# Patient Record
Sex: FEMALE | Race: WHITE | NOT HISPANIC OR LATINO | Employment: OTHER | URBAN - METROPOLITAN AREA
[De-identification: names, ages, dates, MRNs, and addresses within clinical notes are randomized per-mention and may not be internally consistent; named-entity substitution may affect disease eponyms.]

---

## 2017-02-01 ENCOUNTER — ALLSCRIPTS OFFICE VISIT (OUTPATIENT)
Dept: OTHER | Facility: OTHER | Age: 77
End: 2017-02-01

## 2017-02-01 DIAGNOSIS — Z00.00 ENCOUNTER FOR GENERAL ADULT MEDICAL EXAMINATION WITHOUT ABNORMAL FINDINGS: ICD-10-CM

## 2017-02-01 DIAGNOSIS — Z12.31 ENCOUNTER FOR SCREENING MAMMOGRAM FOR MALIGNANT NEOPLASM OF BREAST: ICD-10-CM

## 2017-03-28 ENCOUNTER — ALLSCRIPTS OFFICE VISIT (OUTPATIENT)
Dept: OTHER | Facility: OTHER | Age: 77
End: 2017-03-28

## 2018-01-13 VITALS
SYSTOLIC BLOOD PRESSURE: 118 MMHG | WEIGHT: 168 LBS | DIASTOLIC BLOOD PRESSURE: 64 MMHG | HEIGHT: 62 IN | TEMPERATURE: 98 F | BODY MASS INDEX: 30.91 KG/M2

## 2018-01-14 VITALS
BODY MASS INDEX: 31.47 KG/M2 | WEIGHT: 171 LBS | HEIGHT: 62 IN | SYSTOLIC BLOOD PRESSURE: 126 MMHG | DIASTOLIC BLOOD PRESSURE: 74 MMHG

## 2018-09-12 ENCOUNTER — TELEPHONE (OUTPATIENT)
Dept: OBGYN CLINIC | Facility: CLINIC | Age: 78
End: 2018-09-12

## 2018-09-12 ENCOUNTER — ANNUAL EXAM (OUTPATIENT)
Dept: OBGYN CLINIC | Age: 78
End: 2018-09-12
Payer: MEDICARE

## 2018-09-12 VITALS
BODY MASS INDEX: 30.32 KG/M2 | HEIGHT: 63 IN | SYSTOLIC BLOOD PRESSURE: 108 MMHG | DIASTOLIC BLOOD PRESSURE: 68 MMHG | WEIGHT: 171.13 LBS

## 2018-09-12 DIAGNOSIS — Z01.411 ENCOUNTER FOR GYNECOLOGICAL EXAMINATION WITH ABNORMAL FINDING: Primary | ICD-10-CM

## 2018-09-12 DIAGNOSIS — Z12.31 ENCOUNTER FOR SCREENING MAMMOGRAM FOR MALIGNANT NEOPLASM OF BREAST: ICD-10-CM

## 2018-09-12 DIAGNOSIS — N95.2 ATROPHIC VAGINITIS: ICD-10-CM

## 2018-09-12 DIAGNOSIS — Z78.0 ASYMPTOMATIC AGE-RELATED POSTMENOPAUSAL STATE: ICD-10-CM

## 2018-09-12 PROBLEM — N81.11 MIDLINE CYSTOCELE: Status: ACTIVE | Noted: 2017-02-01

## 2018-09-12 PROCEDURE — G0101 CA SCREEN;PELVIC/BREAST EXAM: HCPCS | Performed by: NURSE PRACTITIONER

## 2018-09-12 RX ORDER — ALPRAZOLAM 0.5 MG/1
0.5 TABLET ORAL 2 TIMES DAILY
COMMUNITY
Start: 2018-04-24

## 2018-09-12 RX ORDER — LEVOTHYROXINE SODIUM 88 UG/1
TABLET ORAL
COMMUNITY
Start: 2018-08-29

## 2018-09-12 RX ORDER — LISINOPRIL 2.5 MG/1
TABLET ORAL
COMMUNITY

## 2018-09-12 NOTE — PATIENT INSTRUCTIONS
Dermatitis   WHAT YOU NEED TO KNOW:   What is dermatitis? Dermatitis is skin inflammation  You may have an itchy rash, redness, or swelling  You may also have bumps or blisters that crust over or ooze clear fluid  What causes dermatitis? Dermatitis may be caused by allergens such as dust mites, pet dander, pollen, and certain foods  Dermatitis can also develop when something touches your skin and irritates it or causes an allergic reaction  Examples include soaps, chemicals, latex, and poison ivy  How is dermatitis diagnosed? Your healthcare provider will examine your skin  He will ask questions about your rash and any other symptoms you have  Tell him if you noticed anything trigger your rash, such as a certain food or activity  Tell him about any medicines you are taking or any allergies or medical conditions you have  How is dermatitis treated? Treatment depends on the cause of your rash  You may need medicines to help decrease itching and inflammation or treat a bacterial infection  They may be given as a topical cream, shot, or a pill  How can I manage my symptoms? · Apply a cool compress to your rash  This will help soothe your skin  · Keep your skin moist   Rub unscented cream or lotion on your skin to prevent dryness and itching  Do this right after a lukewarm bath or shower when your skin is still damp  · Avoid skin irritants  Do not use skin irritants, such as makeup, hair products, soaps, and cleansers  Use products that do not contain fragrances or dye  Call 911 if you have any of the following symptoms of anaphylaxis:   · Sudden trouble breathing    · Throat swelling and tightness    · Dizziness, lightheadedness, fainting, or confusion  When should I seek immediate care? · You develop a fever or have red streaks going up your arm or leg  · Your rash gets more swollen, red, or hot  When should I contact my healthcare provider?    · Your skin blisters, oozes white or yellow pus, or has a foul-smelling discharge  · Your rash spreads or does not get better, even after treatment  · You have questions or concerns about your condition or care  CARE AGREEMENT:   You have the right to help plan your care  Learn about your health condition and how it may be treated  Discuss treatment options with your caregivers to decide what care you want to receive  You always have the right to refuse treatment  The above information is an  only  It is not intended as medical advice for individual conditions or treatments  Talk to your doctor, nurse or pharmacist before following any medical regimen to see if it is safe and effective for you  © 2017 [facility] Information is for End User's use only and may not be sold, redistributed or otherwise used for commercial purposes  All illustrations and images included in CareNotes® are the copyrighted property of A D A M [employer] Inc  or [employer]

## 2018-09-12 NOTE — PROGRESS NOTES
Assessment/Plan:    No problem-specific Assessment & Plan notes found for this encounter  Diagnoses and all orders for this visit:    Encounter for gynecological examination with abnormal finding    Atrophic vaginitis  -     conjugated estrogens (PREMARIN) vaginal cream; Insert 0 5 g into the vagina 2 (two) times a week for 30 days    Asymptomatic age-related postmenopausal state  -     DXA bone density spine hip and pelvis; Future    Encounter for screening mammogram for malignant neoplasm of breast  -     Mammo screening bilateral w 3d & cad; Future    Other orders  -     Cancel: DXA bone density spine hip and pelvis; Future  -     ALPRAZolam (XANAX) 0 5 mg tablet; Take 0 5 mg by mouth 2 (two) times a day  -     aspirin 325 mg tablet; daily  -     Calcium Carbonate (CALTRATE 600) 1500 (600 Ca) MG TABS; 1 tab(s)  -     levothyroxine 88 mcg tablet; TAKE 1 TABLET BY MOUTH ON EMPTY STOMACH EVERY MORNING  -     lisinopril (ZESTRIL) 2 5 mg tablet; Take by mouth  -     Multiple Vitamins-Minerals (MULTIVITAMIN ADULT PO); 1 cap(s)      Call as needed, encouraged calcium/vit D in her diet, all questions answered, return in 6 wks if no relief with premarin for a poss vulvar bx  Subjective:      Patient ID: Jimi Reynolds is a 77 y o  female  Pleasant 77 y o  postmenopausal female here for annual exam  She denies postmenopausal bleeding  Denies history of abnormal pap smears, no pap today  Stopped using pessary, too cumbersome, dealing with incontinence  Also ran out of vaginal premarin 2 weeks ago and since then has been having  vaginal issues of itching and irritation  Denies pelvic pain  Denies postmenopausal issues  Not sexually active  Colonoscopy UTD, followed by PCP  DXA overdue  The following portions of the patient's history were reviewed and updated as appropriate:   She  has no past medical history on file    She   Patient Active Problem List    Diagnosis Date Noted    Atrophic vaginitis 2018    Encounter for gynecological examination with abnormal finding 2018    Atrophic vulvovaginitis 2017    Midline cystocele 2017    Urge incontinence of urine 2013     She  has no past surgical history on file  Her family history includes Colon cancer in her mother  She  reports that she has never smoked  She has never used smokeless tobacco  She reports that she drinks alcohol  She reports that she does not use drugs  Current Outpatient Prescriptions   Medication Sig Dispense Refill    ALPRAZolam (XANAX) 0 5 mg tablet Take 0 5 mg by mouth 2 (two) times a day      aspirin 325 mg tablet daily   Calcium Carbonate (CALTRATE 600) 1500 (600 Ca) MG TABS 1 tab(s)      levothyroxine 88 mcg tablet TAKE 1 TABLET BY MOUTH ON EMPTY STOMACH EVERY MORNING      lisinopril (ZESTRIL) 2 5 mg tablet Take by mouth      Multiple Vitamins-Minerals (MULTIVITAMIN ADULT PO) 1 cap(s)      [START ON 2018] conjugated estrogens (PREMARIN) vaginal cream Insert 0 5 g into the vagina 2 (two) times a week for 30 days 42 5 g 1     No current facility-administered medications for this visit  She has No Known Allergies  OB History    Para Term  AB Living   4 4       4   SAB TAB Ectopic Multiple Live Births           4      # Outcome Date GA Lbr Moe/2nd Weight Sex Delivery Anes PTL Lv   4 Para            3 Para            2 Para            1 Para                   Review of Systems   Constitutional: Negative for activity change, chills, fatigue, fever and unexpected weight change  HENT: Negative for mouth sores and trouble swallowing  Respiratory: Negative for shortness of breath  Gastrointestinal: Negative for anal bleeding, blood in stool, constipation and diarrhea  Genitourinary: Negative for difficulty urinating, dysuria, genital sores and hematuria  Neurological: Negative for weakness  Psychiatric/Behavioral: Negative for confusion and self-injury  Objective:      /68 (BP Location: Left arm, Patient Position: Sitting, Cuff Size: Standard)   Ht 5' 3" (1 6 m)   Wt 77 6 kg (171 lb 2 oz)   LMP  (LMP Unknown)   BMI 30 31 kg/m²          Physical Exam   Constitutional: She appears well-developed and well-nourished  No distress  HENT:   Head: Normocephalic  Neck: Normal range of motion  Pulmonary/Chest: Effort normal  Right breast exhibits no inverted nipple, no mass, no nipple discharge, no skin change and no tenderness  Left breast exhibits no inverted nipple, no mass, no nipple discharge, no skin change and no tenderness  Breasts are symmetrical    Abdominal: Soft  Genitourinary: No breast swelling, tenderness, discharge or bleeding  Pelvic exam was performed with patient supine  There is labial fusion  There is no rash, tenderness, lesion or injury on the right labia  There is no rash, tenderness, lesion or injury on the left labia  Uterus is not deviated, not enlarged, not fixed and not tender  Cervix exhibits no motion tenderness, no discharge and no friability  Right adnexum displays no mass, no tenderness and no fullness  Left adnexum displays no mass, no tenderness and no fullness  No erythema, tenderness or bleeding in the vagina  No foreign body in the vagina  No signs of injury around the vagina  No vaginal discharge found  Genitourinary Comments: Lichen sclerosis features on upper bilateral portions of her labia   Lymphadenopathy:        Right: No inguinal adenopathy present  Left: No inguinal adenopathy present  Vitals reviewed

## 2018-09-13 ENCOUNTER — TELEPHONE (OUTPATIENT)
Dept: OBGYN CLINIC | Facility: CLINIC | Age: 78
End: 2018-09-13

## 2018-09-13 DIAGNOSIS — N95.2 ATROPHIC VAGINITIS: Primary | ICD-10-CM

## 2018-09-14 RX ORDER — ESTRADIOL 0.1 MG/G
0.5 CREAM VAGINAL 2 TIMES WEEKLY
Qty: 42.5 G | Refills: 1 | Status: SHIPPED | OUTPATIENT
Start: 2018-09-17 | End: 2022-04-05 | Stop reason: SDUPTHER

## 2018-11-06 ENCOUNTER — HOSPITAL ENCOUNTER (OUTPATIENT)
Dept: MAMMOGRAPHY | Facility: CLINIC | Age: 78
Discharge: HOME | End: 2018-11-06
Payer: MEDICARE

## 2018-11-06 DIAGNOSIS — Z78.0 ASYMPTOMATIC AGE-RELATED POSTMENOPAUSAL STATE: ICD-10-CM

## 2018-11-06 PROCEDURE — 77080 DXA BONE DENSITY AXIAL: CPT

## 2018-11-09 ENCOUNTER — TELEPHONE (OUTPATIENT)
Dept: OBGYN CLINIC | Facility: CLINIC | Age: 78
End: 2018-11-09

## 2018-11-09 NOTE — TELEPHONE ENCOUNTER
----- Message from Daryl Dumont, [address] sent at 11/9/2018  8:24 AM EST -----  Please notify patient her Dexa scan results showed MODERATE OSTEOPENIA  We can start medication for this if she agrees  She should also continue to work on calcium/vitamin D in her diet and weight bearing exercises  Thanks

## 2019-03-04 ENCOUNTER — HOSPITAL ENCOUNTER (EMERGENCY)
Facility: HOSPITAL | Age: 79
Discharge: HOME | End: 2019-03-04
Attending: EMERGENCY MEDICINE | Admitting: EMERGENCY MEDICINE
Payer: MEDICARE

## 2019-03-04 VITALS
SYSTOLIC BLOOD PRESSURE: 134 MMHG | DIASTOLIC BLOOD PRESSURE: 68 MMHG | WEIGHT: 171.52 LBS | BODY MASS INDEX: 30.39 KG/M2 | TEMPERATURE: 98.6 F | HEIGHT: 63 IN | OXYGEN SATURATION: 95 % | HEART RATE: 102 BPM | RESPIRATION RATE: 16 BRPM

## 2019-03-04 DIAGNOSIS — R04.0 LEFT-SIDED EPISTAXIS: Primary | ICD-10-CM

## 2019-03-04 PROCEDURE — 99283 EMERGENCY DEPT VISIT LOW MDM: CPT

## 2019-03-04 RX ORDER — OXYMETAZOLINE HYDROCHLORIDE 0.05 G/100ML
2 SPRAY NASAL ONCE
Status: COMPLETED | OUTPATIENT
Start: 2019-03-04 | End: 2019-03-04

## 2019-03-04 RX ORDER — OXYMETAZOLINE HYDROCHLORIDE 0.05 G/100ML
2 SPRAY NASAL 2 TIMES DAILY
Qty: 30 ML | Refills: 0 | Status: SHIPPED | OUTPATIENT
Start: 2019-03-04 | End: 2019-03-26 | Stop reason: ALTCHOICE

## 2019-03-04 RX ADMIN — OXYMETAZOLINE HYDROCHLORIDE 2 SPRAY: 5 SPRAY NASAL at 11:50
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
## 2020-12-13 ENCOUNTER — NURSE TRIAGE (OUTPATIENT)
Dept: OTHER | Facility: OTHER | Age: 80
End: 2020-12-13

## 2021-07-19 ENCOUNTER — APPOINTMENT (EMERGENCY)
Dept: RADIOLOGY | Facility: HOSPITAL | Age: 81
End: 2021-07-19
Payer: MEDICARE

## 2021-07-19 ENCOUNTER — HOSPITAL ENCOUNTER (EMERGENCY)
Facility: HOSPITAL | Age: 81
Discharge: HOME | End: 2021-07-19
Attending: EMERGENCY MEDICINE | Admitting: EMERGENCY MEDICINE
Payer: MEDICARE

## 2021-07-19 VITALS
SYSTOLIC BLOOD PRESSURE: 126 MMHG | RESPIRATION RATE: 18 BRPM | HEART RATE: 77 BPM | OXYGEN SATURATION: 97 % | TEMPERATURE: 97.7 F | DIASTOLIC BLOOD PRESSURE: 66 MMHG

## 2021-07-19 DIAGNOSIS — R42 LIGHTHEADEDNESS: Primary | ICD-10-CM

## 2021-07-19 LAB
ALBUMIN SERPL BCP-MCNC: 4.1 G/DL (ref 3.5–5)
ALP SERPL-CCNC: 82 U/L (ref 46–116)
ALT SERPL W P-5'-P-CCNC: 24 U/L (ref 12–78)
ANION GAP SERPL CALCULATED.3IONS-SCNC: 11 MMOL/L (ref 4–13)
AST SERPL W P-5'-P-CCNC: 24 U/L (ref 5–45)
ATRIAL RATE: 75 BPM
BACTERIA UR QL AUTO: ABNORMAL /HPF
BASOPHILS # BLD AUTO: 0.05 THOUSANDS/ΜL (ref 0–0.1)
BASOPHILS NFR BLD AUTO: 1 % (ref 0–1)
BILIRUB SERPL-MCNC: 0.46 MG/DL (ref 0.2–1)
BILIRUB UR QL STRIP: NEGATIVE
BUN SERPL-MCNC: 20 MG/DL (ref 5–25)
CALCIUM SERPL-MCNC: 9.3 MG/DL (ref 8.3–10.1)
CHLORIDE SERPL-SCNC: 105 MMOL/L (ref 100–108)
CLARITY UR: CLEAR
CO2 SERPL-SCNC: 27 MMOL/L (ref 21–32)
COLOR UR: ABNORMAL
CREAT SERPL-MCNC: 1.02 MG/DL (ref 0.6–1.3)
EOSINOPHIL # BLD AUTO: 0.02 THOUSAND/ΜL (ref 0–0.61)
EOSINOPHIL NFR BLD AUTO: 0 % (ref 0–6)
ERYTHROCYTE [DISTWIDTH] IN BLOOD BY AUTOMATED COUNT: 12.6 % (ref 11.6–15.1)
GFR SERPL CREATININE-BSD FRML MDRD: 51 ML/MIN/1.73SQ M
GLUCOSE SERPL-MCNC: 115 MG/DL (ref 65–140)
GLUCOSE UR STRIP-MCNC: NEGATIVE MG/DL
HCT VFR BLD AUTO: 45.4 % (ref 34.8–46.1)
HGB BLD-MCNC: 15.1 G/DL (ref 11.5–15.4)
HGB UR QL STRIP.AUTO: ABNORMAL
IMM GRANULOCYTES # BLD AUTO: 0.02 THOUSAND/UL (ref 0–0.2)
IMM GRANULOCYTES NFR BLD AUTO: 0 % (ref 0–2)
KETONES UR STRIP-MCNC: NEGATIVE MG/DL
LEUKOCYTE ESTERASE UR QL STRIP: NEGATIVE
LYMPHOCYTES # BLD AUTO: 1.12 THOUSANDS/ΜL (ref 0.6–4.47)
LYMPHOCYTES NFR BLD AUTO: 21 % (ref 14–44)
MAGNESIUM SERPL-MCNC: 2.2 MG/DL (ref 1.6–2.6)
MCH RBC QN AUTO: 31.7 PG (ref 26.8–34.3)
MCHC RBC AUTO-ENTMCNC: 33.3 G/DL (ref 31.4–37.4)
MCV RBC AUTO: 95 FL (ref 82–98)
MONOCYTES # BLD AUTO: 0.36 THOUSAND/ΜL (ref 0.17–1.22)
MONOCYTES NFR BLD AUTO: 7 % (ref 4–12)
NEUTROPHILS # BLD AUTO: 3.81 THOUSANDS/ΜL (ref 1.85–7.62)
NEUTS SEG NFR BLD AUTO: 71 % (ref 43–75)
NITRITE UR QL STRIP: NEGATIVE
NON-SQ EPI CELLS URNS QL MICRO: ABNORMAL /HPF
NRBC BLD AUTO-RTO: 0 /100 WBCS
P AXIS: 66 DEGREES
PH UR STRIP.AUTO: 5.5 [PH]
PLATELET # BLD AUTO: 301 THOUSANDS/UL (ref 149–390)
PMV BLD AUTO: 9 FL (ref 8.9–12.7)
POTASSIUM SERPL-SCNC: 4.6 MMOL/L (ref 3.5–5.3)
PR INTERVAL: 144 MS
PROT SERPL-MCNC: 7.9 G/DL (ref 6.4–8.2)
PROT UR STRIP-MCNC: NEGATIVE MG/DL
QRS AXIS: -35 DEGREES
QRSD INTERVAL: 82 MS
QT INTERVAL: 398 MS
QTC INTERVAL: 444 MS
RBC # BLD AUTO: 4.76 MILLION/UL (ref 3.81–5.12)
RBC #/AREA URNS AUTO: ABNORMAL /HPF
SODIUM SERPL-SCNC: 143 MMOL/L (ref 136–145)
SP GR UR STRIP.AUTO: <=1.005 (ref 1–1.03)
T WAVE AXIS: 28 DEGREES
TROPONIN I SERPL-MCNC: <0.02 NG/ML
UROBILINOGEN UR QL STRIP.AUTO: 0.2 E.U./DL
VENTRICULAR RATE: 75 BPM
WBC # BLD AUTO: 5.38 THOUSAND/UL (ref 4.31–10.16)
WBC #/AREA URNS AUTO: ABNORMAL /HPF

## 2021-07-19 PROCEDURE — 81001 URINALYSIS AUTO W/SCOPE: CPT | Performed by: EMERGENCY MEDICINE

## 2021-07-19 PROCEDURE — 84484 ASSAY OF TROPONIN QUANT: CPT | Performed by: EMERGENCY MEDICINE

## 2021-07-19 PROCEDURE — 99284 EMERGENCY DEPT VISIT MOD MDM: CPT | Performed by: EMERGENCY MEDICINE

## 2021-07-19 PROCEDURE — 96360 HYDRATION IV INFUSION INIT: CPT

## 2021-07-19 PROCEDURE — 93010 ELECTROCARDIOGRAM REPORT: CPT | Performed by: INTERNAL MEDICINE

## 2021-07-19 PROCEDURE — 93005 ELECTROCARDIOGRAM TRACING: CPT

## 2021-07-19 PROCEDURE — 96361 HYDRATE IV INFUSION ADD-ON: CPT

## 2021-07-19 PROCEDURE — 36415 COLL VENOUS BLD VENIPUNCTURE: CPT | Performed by: EMERGENCY MEDICINE

## 2021-07-19 PROCEDURE — 80053 COMPREHEN METABOLIC PANEL: CPT | Performed by: EMERGENCY MEDICINE

## 2021-07-19 PROCEDURE — 85025 COMPLETE CBC W/AUTO DIFF WBC: CPT | Performed by: EMERGENCY MEDICINE

## 2021-07-19 PROCEDURE — 83735 ASSAY OF MAGNESIUM: CPT | Performed by: EMERGENCY MEDICINE

## 2021-07-19 PROCEDURE — 71045 X-RAY EXAM CHEST 1 VIEW: CPT

## 2021-07-19 PROCEDURE — 99284 EMERGENCY DEPT VISIT MOD MDM: CPT

## 2021-07-19 RX ADMIN — SODIUM CHLORIDE 1000 ML: 0.9 INJECTION, SOLUTION INTRAVENOUS at 14:06

## 2021-07-19 NOTE — ED PROVIDER NOTES
Pt Name: Fidelia Farley  MRN: [id number]  [place] 1940  Age/Sex: 79 y o  female  Date of evaluation: 7/19/2021  PCP: Alexandra Gray MD    CHIEF COMPLAINT    Chief Complaint   Patient presents with    Dizziness     Pt reports she hs a hx of vertigo but has been feeling dizzy since this morn and it doesnt feel the same  HPI    79 y o  female presenting with lightheadedness  Patient states this morning while she was getting dressed to go to her doctor's appointment she started feeling lightheaded, she felt like if it continued she may pass out  Has history of vertigo and states this did not feel similar to that  Mentions she did not feel safe to drive therefore decided come to the emergency department  States while coming to the emergency department she started feeling better  She hydrated herself and ate a salty pretzel  Denies chest pain, nausea, vomiting, visual changes, numbness/tingling, weakness, fevers, chills  She states around a year ago she had a syncopal episode but did not have any prodrome, she was evaluated and found to be dehydrated and have a UTI  She denies any urinary symptoms  Past Medical and Surgical History    Past Medical History:   Diagnosis Date    Anxiety     Arthritis     Disease of thyroid gland     Hypertension        Past Surgical History:   Procedure Laterality Date    COLON SURGERY  in 2002    TENDON MANIPULATION      maniscus repair in 2013       Family History   Problem Relation Age of Onset    Colon cancer Mother     Ovarian cancer Neg Hx     Breast cancer Neg Hx        Social History     Tobacco Use    Smoking status: Never Smoker    Smokeless tobacco: Never Used   Substance Use Topics    Alcohol use: Yes     Comment: social     Drug use: No           Allergies    No Known Allergies    Home Medications    Prior to Admission medications    Medication Sig Start Date End Date Taking?  Authorizing Provider   ALPRAZolam Donavon Gerber) 0 5 mg tablet Take 0 5 mg by mouth 2 (two) times a day 4/24/18   Historical Provider, MD   aspirin 81 MG tablet daily  Historical Provider, MD   Calcium Carbonate (CALTRATE 600) 1500 (600 Ca) MG TABS 1 tab(s)    Historical Provider, MD   estradiol (ESTRACE) 0 1 mg/g vaginal cream Insert 0 5 g into the vagina 2 (two) times a week 9/17/18   RORY Pimentel   levothyroxine 88 mcg tablet TAKE 1 TABLET BY MOUTH ON EMPTY STOMACH EVERY MORNING 8/29/18   Historical Provider, MD   lisinopril (ZESTRIL) 2 5 mg tablet Take by mouth    Historical Provider, MD   Multiple Vitamins-Minerals (MULTIVITAMIN ADULT PO) 1 cap(s)    Historical Provider, MD           Review of Systems    Review of Systems   Constitutional: Negative for chills and fever  HENT: Negative for rhinorrhea and sore throat  Eyes: Negative for pain and visual disturbance  Respiratory: Negative for cough and shortness of breath  Cardiovascular: Negative for chest pain and leg swelling  Gastrointestinal: Negative for abdominal pain, nausea and vomiting  Genitourinary: Negative for dysuria and hematuria  Musculoskeletal: Negative for back pain and myalgias  Skin: Negative for rash and wound  Neurological: Positive for light-headedness  Negative for syncope and headaches  Physical Exam      ED Triage Vitals [07/19/21 1131]   Temperature Pulse Respirations Blood Pressure SpO2   97 7 °F (36 5 °C) 85 16 147/78 97 %      Temp Source Heart Rate Source Patient Position - Orthostatic VS BP Location FiO2 (%)   Temporal Monitor Sitting Left arm --      Pain Score       --               Physical Exam  Constitutional:       General: She is not in acute distress  Appearance: She is not ill-appearing  HENT:      Head: Normocephalic and atraumatic  Nose: Nose normal    Eyes:      Extraocular Movements: Extraocular movements intact  Pupils: Pupils are equal, round, and reactive to light     Cardiovascular:      Rate and Rhythm: Normal rate and regular rhythm  Heart sounds: No murmur heard  Pulmonary:      Effort: No respiratory distress  Breath sounds: Normal breath sounds  No wheezing  Abdominal:      General: There is no distension  Palpations: Abdomen is soft  Tenderness: There is no abdominal tenderness  Musculoskeletal:         General: No swelling or deformity  Normal range of motion  Cervical back: Normal range of motion and neck supple  Skin:     General: Skin is warm  Findings: No erythema  Neurological:      Mental Status: She is alert and oriented to person, place, and time  Mental status is at baseline  Cranial Nerves: No cranial nerve deficit  Sensory: No sensory deficit  Motor: No weakness        Comments: Normal finger-nose exam bilaterally              Diagnostic Results      Labs:    Results Reviewed     Procedure Component Value Units Date/Time    Urine Microscopic [[id number]]  (Abnormal) Collected: 07/19/21 1509    Lab Status: Final result Specimen: Urine, Clean Catch Updated: 07/19/21 1534     RBC, UA 0-1 /hpf      WBC, UA None Seen /hpf      Epithelial Cells Moderate /hpf      Bacteria, UA None Seen /hpf     UA (URINE) with reflex to Scope [[id number]]  (Abnormal) Collected: 07/19/21 1509    Lab Status: Final result Specimen: Urine, Clean Catch Updated: 07/19/21 1521     Color, UA Light Yellow     Clarity, UA Clear     Specific Gravity, UA <=1 005     pH, UA 5 5     Leukocytes, UA Negative     Nitrite, UA Negative     Protein, UA Negative mg/dl      Glucose, UA Negative mg/dl      Ketones, UA Negative mg/dl      Urobilinogen, UA 0 2 E U /dl      Bilirubin, UA Negative     Blood, UA Trace-Intact    Troponin I [[id number]]  (Normal) Collected: 07/19/21 1406    Lab Status: Final result Specimen: Blood from Arm, Right Updated: 07/19/21 1432     Troponin I <0 02 ng/mL     Comprehensive metabolic panel [[id number]] Collected: 07/19/21 1406    Lab Status: Final result Specimen: Blood from Arm, Right Updated: 07/19/21 1430     Sodium 143 mmol/L      Potassium 4 6 mmol/L      Chloride 105 mmol/L      CO2 27 mmol/L      ANION GAP 11 mmol/L      BUN 20 mg/dL      Creatinine 1 02 mg/dL      Glucose 115 mg/dL      Calcium 9 3 mg/dL      AST 24 U/L      ALT 24 U/L      Alkaline Phosphatase 82 U/L      Total Protein 7 9 g/dL      Albumin 4 1 g/dL      Total Bilirubin 0 46 mg/dL      eGFR 51 ml/min/1 73sq m     Narrative:      [place] guidelines for Chronic Kidney Disease (CKD):     Stage 1 with normal or high GFR (GFR > 90 mL/min/1 73 square meters)    Stage 2 Mild CKD (GFR = 60-89 mL/min/1 73 square meters)    Stage 3A Moderate CKD (GFR = 45-59 mL/min/1 73 square meters)    Stage 3B Moderate CKD (GFR = 30-44 mL/min/1 73 square meters)    Stage 4 Severe CKD (GFR = 15-29 mL/min/1 73 square meters)    Stage 5 End Stage CKD (GFR <15 mL/min/1 73 square meters)  Note: GFR calculation is accurate only with a steady state creatinine    Magnesium [[id number]]  (Normal) Collected: 07/19/21 1406    Lab Status: Final result Specimen: Blood from Arm, Right Updated: 07/19/21 1430     Magnesium 2 2 mg/dL     CBC and differential [[id number]] Collected: 07/19/21 1406    Lab Status: Final result Specimen: Blood from Arm, Right Updated: 07/19/21 1415     WBC 5 38 Thousand/uL      RBC 4 76 Million/uL      Hemoglobin 15 1 g/dL      Hematocrit 45 4 %      MCV 95 fL      MCH 31 7 pg      MCHC 33 3 g/dL      RDW 12 6 %      MPV 9 0 fL      Platelets 849 Thousands/uL      nRBC 0 /100 WBCs      Neutrophils Relative 71 %      Immat GRANS % 0 %      Lymphocytes Relative 21 %      Monocytes Relative 7 %      Eosinophils Relative 0 %      Basophils Relative 1 %      Neutrophils Absolute 3 81 Thousands/µL      Immature Grans Absolute 0 02 Thousand/uL      Lymphocytes Absolute 1 12 Thousands/µL      Monocytes Absolute 0 36 Thousand/µL      Eosinophils Absolute 0 02 Thousand/µL      Basophils Absolute 0 05 Thousands/µL           All labs reviewed and utilized in the medical decision making process    Radiology:    XR chest 1 view portable   Final Result      No acute cardiopulmonary disease  Workstation performed: [id number]             All radiology studies independently viewed by me and interpreted by the radiologist     Procedure    Procedures        MDM    Patient describing presyncope, not concerned for vertigo or posterior circulation pathology  Neurologically intact without any focal deficits  NIHSS 0  Currently she is asymptomatic  No lightheadedness or dizziness otherwise with sitting up and standing up and walking currently  At no point did she have chest pain shortness of breath or generalized weakness  History of syncopal event without prodrome  Past medical history of hypertension  Will obtain blood work, EKG, chest x-ray  Will give IV fluids  Will re-evaluate  EKG shows normal sinus rhythm with heart rate of 75, narrow QRS, intervals within normal limits, no ST elevation, no significant ST depression, no evidence of Brugada syndrome, no delta waves  ED Course as of Jul 19 1542   Mon Jul 19, 2021   1410 No acute cardiopulmonary processes on my read  XR chest 1 view portable   1542 Blood work and UA are all reassuring  Patient remains asymptomatic  Vitals are reassuring  I did offer hospitalization, however patient would rather follow-up with her family doctor  Advised hydration  Advised to discuss Holter monitor with family doctor  Return precautions discussed, she verbalized understanding             Medications   sodium chloride 0 9 % bolus 1,000 mL (1,000 mL Intravenous New Bag 7/19/21 1406)           FINAL IMPRESSION    Final diagnoses:   Lightheadedness         DISPOSITION    Time reflects when diagnosis was documented in both MDM as applicable and the Disposition within this note     Time User Action Codes Description Comment    7/19/2021  3:37 PM Tino Weaver Add [R42] Lightheadedness       ED Disposition     ED Disposition Condition Date/Time Comment    Discharge Stable Mon Jul 19, 2021  3:37 PM Moni Rhoades discharge to home/self care  Follow-up Information     Follow up With Specialties Details Why Contact Info    Jack Evans MD Internal Medicine Schedule an appointment as soon as possible for a visit in 1 day  [address] [facility]              PATIENT REFERRED TO:    Jack Evans MD  [facility] [address]    Schedule an appointment as soon as possible for a visit in 1 day        DISCHARGE MEDICATIONS:    Patient's Medications   Discharge Prescriptions    No medications on file       No discharge procedures on file  Yadira Pace DO        This note was partially completed using voice recognition technology, and was scanned for gross errors; however some errors may still exist  Please contact the author with any questions or requests for clarification        Yadira Pace DO  07/19/21 1597

## 2021-07-19 NOTE — DISCHARGE INSTRUCTIONS
Please follow-up with your family doctor, you may benefit from Holter monitor  Drink plenty of fluids stay hydrated  Return to the emergency department for any worsening symptoms

## 2022-02-02 ENCOUNTER — OFFICE VISIT (OUTPATIENT)
Dept: OBGYN CLINIC | Facility: CLINIC | Age: 82
End: 2022-02-02
Payer: MEDICARE

## 2022-02-02 VITALS
WEIGHT: 167 LBS | BODY MASS INDEX: 30.73 KG/M2 | DIASTOLIC BLOOD PRESSURE: 90 MMHG | SYSTOLIC BLOOD PRESSURE: 130 MMHG | HEIGHT: 62 IN

## 2022-02-02 DIAGNOSIS — R32 URINARY INCONTINENCE, UNSPECIFIED TYPE: ICD-10-CM

## 2022-02-02 DIAGNOSIS — Z78.0 ASYMPTOMATIC POSTMENOPAUSAL STATUS: ICD-10-CM

## 2022-02-02 DIAGNOSIS — N95.2 ATROPHIC VULVOVAGINITIS: ICD-10-CM

## 2022-02-02 DIAGNOSIS — Z01.411 ENCOUNTER FOR GYNECOLOGICAL EXAMINATION WITH ABNORMAL FINDING: Primary | ICD-10-CM

## 2022-02-02 PROBLEM — F41.1 GENERALIZED ANXIETY DISORDER: Status: ACTIVE | Noted: 2017-12-17

## 2022-02-02 PROBLEM — E78.5 HYPERLIPIDEMIA: Status: ACTIVE | Noted: 2017-12-17

## 2022-02-02 PROBLEM — E03.9 HYPOTHYROIDISM: Status: ACTIVE | Noted: 2017-12-17

## 2022-02-02 PROBLEM — M17.9 OSTEOARTHRITIS OF KNEE: Status: ACTIVE | Noted: 2017-12-17

## 2022-02-02 PROBLEM — F32.A DEPRESSION: Status: ACTIVE | Noted: 2017-12-17

## 2022-02-02 PROBLEM — I10 ESSENTIAL HYPERTENSION: Status: ACTIVE | Noted: 2017-12-17

## 2022-02-02 PROBLEM — R73.01 IMPAIRED FASTING GLUCOSE: Status: ACTIVE | Noted: 2017-12-17

## 2022-02-02 PROBLEM — R74.8 ABNORMAL TRANSAMINASES: Status: ACTIVE | Noted: 2017-12-17

## 2022-02-02 PROBLEM — M17.10 OSTEOARTHRITIS OF KNEE: Status: ACTIVE | Noted: 2017-12-17

## 2022-02-02 PROCEDURE — G0101 CA SCREEN;PELVIC/BREAST EXAM: HCPCS | Performed by: NURSE PRACTITIONER

## 2022-02-02 RX ORDER — PREDNISOLONE ACETATE 10 MG/ML
SUSPENSION/ DROPS OPHTHALMIC
COMMUNITY
Start: 2022-01-21

## 2022-02-02 RX ORDER — CALCIUM POLYCARBOPHIL 625 MG 625 MG/1
625 TABLET ORAL DAILY
COMMUNITY

## 2022-02-02 RX ORDER — OFLOXACIN 3 MG/ML
SOLUTION/ DROPS OPHTHALMIC
COMMUNITY
Start: 2021-12-17

## 2022-02-02 NOTE — PATIENT INSTRUCTIONS
Breast Self Exam for Women   AMBULATORY CARE:   A breast self-exam (BSE)  is a way to check your breasts for lumps and other changes  Regular BSEs can help you know how your breasts normally look and feel  Most breast lumps or changes are not cancer, but you should always have them checked by a healthcare provider  Why you should do a BSE:  Breast cancer is the most common type of cancer in women  Even if you have mammograms, you may still want to do a BSE regularly  If you know how your breasts normally feel and look, it may help you know when to contact your healthcare provider  Mammograms can miss some cancers  You may find a lump during a BSE that did not show up on a mammogram   When you should do a BSE:  If you have periods, you may want to do your BSE 1 week after your period ends  This is the time when your breasts may be the least swollen, lumpy, or tender  You can do regular BSEs even if you are breastfeeding or have breast implants  Call your doctor if:   · You find any lumps or changes in your breasts  · You have breast pain or fluid coming from your nipples  · You have questions or concerns about your condition or care  How to do a BSE:       · Look at your breasts in a mirror  Look at the size and shape of each breast and nipple  Check for swelling, lumps, dimpling, scaly skin, or other skin changes  Look for nipple changes, such as a nipple that is painful or beginning to pull inward  Gently squeeze both nipples and check to see if fluid (that is not breast milk) comes out of them  If you find any of these or other breast changes, contact your healthcare provider  Check your breasts while you sit or  the following 3 positions:    ? Hang your arms down at your sides  ? Raise your hands and join them behind your head  ? Put firm pressure with your hands on your hips  Bend slightly forward while you look at your breasts in the mirror  · Lie down and feel your breasts    When you lie down, your breast tissue spreads out evenly over your chest  This makes it easier for you to feel for lumps and anything that may not be normal for your breasts  Do a BSE on one breast at a time  ? Place a small pillow or towel under your left shoulder  Put your left arm behind your head  ? Use the 3 middle fingers of your right hand  Use your fingertip pads, on the top of your fingers  Your fingertip pad is the most sensitive part of your finger  ? Use small circles to feel your breast tissue  Use your fingertip pads to make dime-sized, overlapping circles on your breast and armpits  Use light, medium, and firm pressure  First, press lightly  Second, press with medium pressure to feel a little deeper into the breast  Last, use firm pressure to feel deep within your breast     ? Examine your entire breast area  Examine the breast area from above the breast to below the breast where you feel only ribs  Make small circles with your fingertips, starting in the middle of your armpit  Make circles going up and down the breast area  Continue toward your breast and all the way across it  Examine the area from your armpit all the way over to the middle of your chest (breastbone)  Stop at the middle of your chest     ? Move the pillow or towel to your right shoulder, and put your right arm behind your head  Use the 3 fingertip pads of your left hand, and repeat the above steps to do a BSE on your right breast     What else you can do to check for breast problems or cancer:  Talk to your healthcare provider about mammograms  A mammogram is an x-ray of your breasts to screen for breast cancer or other problems  Your provider can tell you the benefits and risks of mammograms  The first mammogram is usually at age 39 or 48  Your provider may recommend you start at 36 or younger if your risk for breast cancer is high  Mammograms usually continue every 1 to 2 years until age 76         Follow up with your doctor as directed:  Write down your questions so you remember to ask them during your visits  © Copyright [employer] 2021 Information is for End User's use only and may not be sold, redistributed or otherwise used for commercial purposes  All illustrations and images included in CareNotes® are the copyrighted property of [employer]  or Zoey Chavis  The above information is an  only  It is not intended as medical advice for individual conditions or treatments  Talk to your doctor, nurse or pharmacist before following any medical regimen to see if it is safe and effective for you

## 2022-02-02 NOTE — PROGRESS NOTES
Assessment/Plan:    No problem-specific Assessment & Plan notes found for this encounter  Diagnoses and all orders for this visit:    Encounter for gynecological examination with abnormal finding    Atrophic vulvovaginitis       -      Continue with vaginal estrogen  She states no refill needed today  Urinary incontinence, unspecified type  -     Ambulatory referral to Urogynecology; Future    Asymptomatic postmenopausal status  -     DXA bone density spine hip and pelvis; Future      Call as needed, encouraged calcium/vit D in her diet, all questions answered, return in 2 wks for a vulvar bx  Subjective:      Patient ID: Bryson Will is a 79 y o  female  Pleasant 79 y o  postmenopausal female here for annual exam  She was last seen in 2018 for an annual and her exam was c/w possible LS but she never returned for a vulvar biopsy  She returns today to discuss prolapse surgery  She denies postmenopausal bleeding  Denies history of abnormal pap smears, last pap nml in 2014, no pap done today  She stopped using a pessary for her prolapse many yrs ago  It was too "cumbersome"  She admits to worsening incontinence so is interested in surgical correction  She does use vaginal premarin with some relief of her vaginal itching and irritation but she admits she has felt worse now  Denies pelvic pain  Denies postmenopausal issues  Not sexually active  Colonoscopy and DXA are overdue, she declines a repeat  The following portions of the patient's history were reviewed and updated as appropriate:   She  has a past medical history of Anxiety, Arthritis, Disease of thyroid gland, and Hypertension    She   Patient Active Problem List    Diagnosis Date Noted    Osteoarthritis of knee 12/17/2017    Hypothyroidism 12/17/2017    Hyperlipidemia 12/17/2017    Generalized anxiety disorder 12/17/2017    Essential hypertension 12/17/2017    Depression 12/17/2017    Abnormal transaminases 12/17/2017    Impaired fasting glucose 2017    Atrophic vulvovaginitis 2017    Midline cystocele 2017    Urge incontinence of urine 2013    Absence of bladder continence 2013     She  has a past surgical history that includes Colon surgery (in ) and Tendon manipulation  Her family history includes Colon cancer in her mother  She  reports that she has never smoked  She has never used smokeless tobacco  She reports current alcohol use  She reports that she does not use drugs  Current Outpatient Medications   Medication Sig Dispense Refill    ALPRAZolam (XANAX) 0 5 mg tablet Take 0 5 mg by mouth 2 (two) times a day      Calcium Carbonate (CALTRATE 600) 1500 (600 Ca) MG TABS 1 tab(s)      calcium polycarbophil (FIBERCON) 625 mg tablet Take 625 mg by mouth daily      estradiol (ESTRACE) 0 1 mg/g vaginal cream Insert 0 5 g into the vagina 2 (two) times a week 42 5 g 1    levothyroxine 88 mcg tablet TAKE 1 TABLET BY MOUTH ON EMPTY STOMACH EVERY MORNING      lisinopril (ZESTRIL) 2 5 mg tablet Take by mouth      Multiple Vitamins-Minerals (MULTIVITAMIN ADULT PO) 1 cap(s)      ofloxacin (OCUFLOX) 0 3 % ophthalmic solution INSTILL 1 DROP INTO RIGHT EYE 4 X A DAY AS DIRECTED START 3 DAYS PRIOR TO SURGERY IN OPERATIVE EYE      prednisoLONE acetate (PRED FORTE) 1 % ophthalmic suspension INSTILL 1 DROP INTO RIGHT EYE FOUR TIMES A DAY AS DIRECTED USE AFTER SURGERY TO OPERATIVE EYE  No current facility-administered medications for this visit  She has No Known Allergies  OB History    Para Term  AB Living   4 4       4   SAB IAB Ectopic Multiple Live Births           4      # Outcome Date GA Lbr Moe/2nd Weight Sex Delivery Anes PTL Lv   4 Para            3 Para            2 Para            1 Para                Review of Systems   Constitutional: Negative for activity change, chills, fatigue, fever and unexpected weight change     HENT: Negative for mouth sores and trouble swallowing  Respiratory: Negative for shortness of breath  Gastrointestinal: Negative for anal bleeding, blood in stool, constipation and diarrhea  Genitourinary: Negative for difficulty urinating, dysuria, genital sores and hematuria  Neurological: Negative for weakness  Psychiatric/Behavioral: Negative for confusion and self-injury  Objective:      /90   Ht 5' 2" (1 575 m)   Wt 75 8 kg (167 lb)   LMP  (LMP Unknown)   Breastfeeding No   BMI 30 54 kg/m²          Physical Exam  Vitals reviewed  Constitutional:       General: She is not in acute distress  Appearance: She is well-developed  HENT:      Head: Normocephalic  Pulmonary:      Effort: Pulmonary effort is normal    Chest:   Breasts: Breasts are symmetrical       Right: No inverted nipple, mass, nipple discharge, skin change or tenderness  Left: No inverted nipple, mass, nipple discharge, skin change or tenderness  Abdominal:      Palpations: Abdomen is soft  Genitourinary:     Exam position: Supine  Labia:         Right: No rash, tenderness, lesion or injury  Left: No rash, tenderness, lesion or injury  Vagina: No signs of injury and foreign body  No vaginal discharge, erythema, tenderness or bleeding  Cervix: No cervical motion tenderness, discharge or friability  Uterus: Not deviated, not enlarged, not fixed and not tender  Adnexa:         Right: No mass, tenderness or fullness  Left: No mass, tenderness or fullness  Comments: Lichen sclerosis features on upper bilateral portions of her labia and lower labia with white plaques noted  Fusion noted  +uterine prolapse noted, Grade 3  Musculoskeletal:      Cervical back: Normal range of motion  Lymphadenopathy:      Lower Body: No right inguinal adenopathy  No left inguinal adenopathy

## 2022-02-15 ENCOUNTER — APPOINTMENT (EMERGENCY)
Dept: CT IMAGING | Facility: HOSPITAL | Age: 82
End: 2022-02-15
Payer: MEDICARE

## 2022-02-15 ENCOUNTER — HOSPITAL ENCOUNTER (EMERGENCY)
Facility: HOSPITAL | Age: 82
Discharge: HOME | End: 2022-02-15
Attending: EMERGENCY MEDICINE | Admitting: EMERGENCY MEDICINE
Payer: MEDICARE

## 2022-02-15 VITALS
RESPIRATION RATE: 18 BRPM | OXYGEN SATURATION: 98 % | HEIGHT: 62 IN | WEIGHT: 160 LBS | SYSTOLIC BLOOD PRESSURE: 163 MMHG | TEMPERATURE: 97.8 F | DIASTOLIC BLOOD PRESSURE: 89 MMHG | HEART RATE: 91 BPM | BODY MASS INDEX: 29.44 KG/M2

## 2022-02-15 DIAGNOSIS — R68.89 DOES NOT FEEL RIGHT: Primary | ICD-10-CM

## 2022-02-15 DIAGNOSIS — R68.89 FULLNESS IN HEAD: ICD-10-CM

## 2022-02-15 LAB
ALBUMIN SERPL BCP-MCNC: 4 G/DL (ref 3.5–5)
ALP SERPL-CCNC: 69 U/L (ref 46–116)
ALT SERPL W P-5'-P-CCNC: 22 U/L (ref 12–78)
ANION GAP SERPL CALCULATED.3IONS-SCNC: 5 MMOL/L (ref 4–13)
AST SERPL W P-5'-P-CCNC: 20 U/L (ref 5–45)
ATRIAL RATE: 87 BPM
BACTERIA UR QL AUTO: NORMAL /HPF
BASOPHILS # BLD AUTO: 0.05 THOUSANDS/ΜL (ref 0–0.1)
BASOPHILS NFR BLD AUTO: 1 % (ref 0–1)
BILIRUB SERPL-MCNC: 0.45 MG/DL (ref 0.2–1)
BILIRUB UR QL STRIP: NEGATIVE
BUN SERPL-MCNC: 18 MG/DL (ref 5–25)
CALCIUM SERPL-MCNC: 9.1 MG/DL (ref 8.3–10.1)
CARDIAC TROPONIN I PNL SERPL HS: 4 NG/L
CHLORIDE SERPL-SCNC: 104 MMOL/L (ref 100–108)
CLARITY UR: CLEAR
CO2 SERPL-SCNC: 27 MMOL/L (ref 21–32)
COLOR UR: YELLOW
CREAT SERPL-MCNC: 0.9 MG/DL (ref 0.6–1.3)
EOSINOPHIL # BLD AUTO: 0.03 THOUSAND/ΜL (ref 0–0.61)
EOSINOPHIL NFR BLD AUTO: 1 % (ref 0–6)
ERYTHROCYTE [DISTWIDTH] IN BLOOD BY AUTOMATED COUNT: 12.7 % (ref 11.6–15.1)
GFR SERPL CREATININE-BSD FRML MDRD: 59 ML/MIN/1.73SQ M
GLUCOSE SERPL-MCNC: 142 MG/DL (ref 65–140)
GLUCOSE UR STRIP-MCNC: NEGATIVE MG/DL
HCT VFR BLD AUTO: 44.5 % (ref 34.8–46.1)
HGB BLD-MCNC: 14.7 G/DL (ref 11.5–15.4)
HGB UR QL STRIP.AUTO: ABNORMAL
IMM GRANULOCYTES # BLD AUTO: 0.01 THOUSAND/UL (ref 0–0.2)
IMM GRANULOCYTES NFR BLD AUTO: 0 % (ref 0–2)
KETONES UR STRIP-MCNC: NEGATIVE MG/DL
LEUKOCYTE ESTERASE UR QL STRIP: NEGATIVE
LYMPHOCYTES # BLD AUTO: 1.2 THOUSANDS/ΜL (ref 0.6–4.47)
LYMPHOCYTES NFR BLD AUTO: 20 % (ref 14–44)
MCH RBC QN AUTO: 31.8 PG (ref 26.8–34.3)
MCHC RBC AUTO-ENTMCNC: 33 G/DL (ref 31.4–37.4)
MCV RBC AUTO: 96 FL (ref 82–98)
MONOCYTES # BLD AUTO: 0.4 THOUSAND/ΜL (ref 0.17–1.22)
MONOCYTES NFR BLD AUTO: 7 % (ref 4–12)
NEUTROPHILS # BLD AUTO: 4.37 THOUSANDS/ΜL (ref 1.85–7.62)
NEUTS SEG NFR BLD AUTO: 71 % (ref 43–75)
NITRITE UR QL STRIP: NEGATIVE
NON-SQ EPI CELLS URNS QL MICRO: NORMAL /HPF
NRBC BLD AUTO-RTO: 0 /100 WBCS
P AXIS: 66 DEGREES
PH UR STRIP.AUTO: 6 [PH]
PLATELET # BLD AUTO: 311 THOUSANDS/UL (ref 149–390)
PMV BLD AUTO: 9 FL (ref 8.9–12.7)
POTASSIUM SERPL-SCNC: 3.9 MMOL/L (ref 3.5–5.3)
PR INTERVAL: 146 MS
PROT SERPL-MCNC: 7.3 G/DL (ref 6.4–8.2)
PROT UR STRIP-MCNC: NEGATIVE MG/DL
QRS AXIS: -32 DEGREES
QRSD INTERVAL: 82 MS
QT INTERVAL: 364 MS
QTC INTERVAL: 438 MS
RBC # BLD AUTO: 4.62 MILLION/UL (ref 3.81–5.12)
RBC #/AREA URNS AUTO: NORMAL /HPF
SODIUM SERPL-SCNC: 136 MMOL/L (ref 136–145)
SP GR UR STRIP.AUTO: <=1.005 (ref 1–1.03)
T WAVE AXIS: 51 DEGREES
UROBILINOGEN UR QL STRIP.AUTO: 0.2 E.U./DL
VENTRICULAR RATE: 87 BPM
WBC # BLD AUTO: 6.06 THOUSAND/UL (ref 4.31–10.16)
WBC #/AREA URNS AUTO: NORMAL /HPF

## 2022-02-15 PROCEDURE — 80053 COMPREHEN METABOLIC PANEL: CPT | Performed by: PHYSICIAN ASSISTANT

## 2022-02-15 PROCEDURE — 99285 EMERGENCY DEPT VISIT HI MDM: CPT | Performed by: PHYSICIAN ASSISTANT

## 2022-02-15 PROCEDURE — 85025 COMPLETE CBC W/AUTO DIFF WBC: CPT | Performed by: PHYSICIAN ASSISTANT

## 2022-02-15 PROCEDURE — 36415 COLL VENOUS BLD VENIPUNCTURE: CPT | Performed by: PHYSICIAN ASSISTANT

## 2022-02-15 PROCEDURE — 96360 HYDRATION IV INFUSION INIT: CPT

## 2022-02-15 PROCEDURE — 93010 ELECTROCARDIOGRAM REPORT: CPT | Performed by: INTERNAL MEDICINE

## 2022-02-15 PROCEDURE — 81001 URINALYSIS AUTO W/SCOPE: CPT | Performed by: PHYSICIAN ASSISTANT

## 2022-02-15 PROCEDURE — 84484 ASSAY OF TROPONIN QUANT: CPT | Performed by: PHYSICIAN ASSISTANT

## 2022-02-15 PROCEDURE — 96361 HYDRATE IV INFUSION ADD-ON: CPT

## 2022-02-15 PROCEDURE — 99284 EMERGENCY DEPT VISIT MOD MDM: CPT

## 2022-02-15 PROCEDURE — 93005 ELECTROCARDIOGRAM TRACING: CPT

## 2022-02-15 PROCEDURE — 70450 CT HEAD/BRAIN W/O DYE: CPT

## 2022-02-15 RX ADMIN — SODIUM CHLORIDE 500 ML: 0.9 INJECTION, SOLUTION INTRAVENOUS at 07:37

## 2022-02-15 NOTE — ED PROVIDER NOTES
History  Chief Complaint   Patient presents with    Medical Problem     Patient states after showering last night falling alseep she woke up with a feeling of fullness in her head  Patient denies dizziness or lightheadedness, but states she is not feeling right   Possible UTI     Patient states she is also concerned for UTI, as she was just diagnosed with uterine prolapse  Patient has bladder prolapse also  80yo female with a history of hypertension, hypothyroidism, and anxiety presenting for evaluation of a weird feeling in her head  Patient states she woke up around 0100 this morning after a bad dream  She states she felt a "wooshing" in her head with a fullness feeling  Patient denies any headache and denies dizziness  Patient has a history of tinnitus for the past several years and states this feels different  She decided to check her blood pressure which was around 140/90 and her heart rate was in the 90s which is high for her  She became worried that she was having a heart attack and decided to take an extra blood pressure pill and a Xanax  Patient's symptoms continued throughout the night although are gradually improving  She is also worried that she may have a UTI as she is having some mild difficulty emptying her bladder and has a history of a uterine prolapse  Patient is otherwise asymptomatic and denies any chest pain, shortness of breath, palpitations, visual changes, paresthesias, weakness, dysarthria  History provided by:  Patient   used: No    Medical Problem  Location:  Head  Quality:  "Wooshing" feeling  Severity:  Moderate  Onset quality:  Gradual  Timing:  Constant  Progression:  Improving  Chronicity:  New  Context:  No head trauma   Woke up with a weird feeling in her head  Relieved by:  Nothing  Worsened by:  Nothing  Associated symptoms: no abdominal pain, no chest pain, no fever, no headaches, no loss of consciousness, no nausea, no rash, no shortness of breath and no vomiting        Prior to Admission Medications   Prescriptions Last Dose Informant Patient Reported? Taking? ALPRAZolam (XANAX) 0 5 mg tablet  Self Yes No   Sig: Take 0 5 mg by mouth 2 (two) times a day   Calcium Carbonate (CALTRATE 600) 1500 (600 Ca) MG TABS  Self Yes No   Si tab(s)   Multiple Vitamins-Minerals (MULTIVITAMIN ADULT PO)  Self Yes No   Si cap(s)   calcium polycarbophil (FIBERCON) 625 mg tablet   Yes No   Sig: Take 625 mg by mouth daily   estradiol (ESTRACE) 0 1 mg/g vaginal cream   No No   Sig: Insert 0 5 g into the vagina 2 (two) times a week   levothyroxine 88 mcg tablet  Self Yes No   Sig: TAKE 1 TABLET BY MOUTH ON EMPTY STOMACH EVERY MORNING   lisinopril (ZESTRIL) 2 5 mg tablet  Self Yes No   Sig: Take by mouth   ofloxacin (OCUFLOX) 0 3 % ophthalmic solution   Yes No   Sig: INSTILL 1 DROP INTO RIGHT EYE 4 X A DAY AS DIRECTED START 3 DAYS PRIOR TO SURGERY IN OPERATIVE EYE   prednisoLONE acetate (PRED FORTE) 1 % ophthalmic suspension   Yes No   Sig: INSTILL 1 DROP INTO RIGHT EYE FOUR TIMES A DAY AS DIRECTED USE AFTER SURGERY TO OPERATIVE EYE  Facility-Administered Medications: None       Past Medical History:   Diagnosis Date    Anxiety     Arthritis     Disease of thyroid gland     Hypertension        Past Surgical History:   Procedure Laterality Date    COLON SURGERY  in     TENDON MANIPULATION      maniscus repair in        Family History   Problem Relation Age of Onset    Colon cancer Mother     Ovarian cancer Neg Hx     Breast cancer Neg Hx      I have reviewed and agree with the history as documented  E-Cigarette/Vaping     E-Cigarette/Vaping Substances     Social History     Tobacco Use    Smoking status: Never Smoker    Smokeless tobacco: Never Used   Substance Use Topics    Alcohol use: Yes     Comment: social     Drug use: No       Review of Systems   Constitutional: Negative for chills and fever     HENT: Negative for drooling and voice change  Eyes: Negative for discharge, redness and visual disturbance  Respiratory: Negative for shortness of breath and stridor  Cardiovascular: Negative for chest pain, palpitations and leg swelling  Gastrointestinal: Negative for abdominal pain, nausea and vomiting  Genitourinary: Positive for difficulty urinating  Negative for dysuria  Musculoskeletal: Negative for neck pain and neck stiffness  Skin: Negative for color change and rash  Neurological: Negative for dizziness, seizures, loss of consciousness, syncope, speech difficulty, weakness, light-headedness, numbness and headaches  +Head fullness   Psychiatric/Behavioral: Negative for confusion  The patient is nervous/anxious  All other systems reviewed and are negative  Physical Exam  Physical Exam  Vitals and nursing note reviewed  Constitutional:       General: She is not in acute distress  Appearance: Normal appearance  She is not toxic-appearing  HENT:      Head: Normocephalic and atraumatic  Right Ear: External ear normal       Left Ear: External ear normal    Eyes:      General: No scleral icterus  Right eye: No discharge  Left eye: No discharge  Conjunctiva/sclera: Conjunctivae normal       Pupils: Pupils are equal, round, and reactive to light  Cardiovascular:      Rate and Rhythm: Normal rate  Pulmonary:      Effort: Pulmonary effort is normal  No respiratory distress  Breath sounds: No stridor  Abdominal:      General: Abdomen is flat  There is no distension  Tenderness: There is no abdominal tenderness  There is no guarding  Musculoskeletal:         General: No deformity  Normal range of motion  Cervical back: Normal range of motion  Skin:     General: Skin is warm and dry  Neurological:      General: No focal deficit present  Mental Status: She is alert  Mental status is at baseline        Comments: No focal deficits   Psychiatric:         Mood and Affect: Mood normal          Behavior: Behavior normal          Vital Signs  ED Triage Vitals   Temperature Pulse Respirations Blood Pressure SpO2   02/15/22 0815 02/15/22 0709 02/15/22 0709 02/15/22 0709 02/15/22 0709   97 8 °F (36 6 °C) 91 18 163/89 98 %      Temp Source Heart Rate Source Patient Position - Orthostatic VS BP Location FiO2 (%)   02/15/22 0815 02/15/22 0709 -- -- --   Oral Monitor         Pain Score       --                  Vitals:    02/15/22 0709   BP: 163/89   Pulse: 91         Visual Acuity      ED Medications  Medications   sodium chloride 0 9 % bolus 500 mL (0 mL Intravenous Stopped 2/15/22 0925)       Diagnostic Studies  Results Reviewed     Procedure Component Value Units Date/Time    Urine Microscopic [[id number]]  (Normal) Collected: 02/15/22 0820    Lab Status: Final result Specimen: Urine, Clean Catch Updated: 02/15/22 0856     RBC, UA 1-2 /hpf      WBC, UA None Seen /hpf      Epithelial Cells Occasional /hpf      Bacteria, UA None Seen /hpf     UA w Reflex to Microscopic w Reflex to Culture [[id number]]  (Abnormal) Collected: 02/15/22 0820    Lab Status: Final result Specimen: Urine, Clean Catch Updated: 02/15/22 0840     Color, UA Yellow     Clarity, UA Clear     Specific Gravity, UA <=1 005     pH, UA 6 0     Leukocytes, UA Negative     Nitrite, UA Negative     Protein, UA Negative mg/dl      Glucose, UA Negative mg/dl      Ketones, UA Negative mg/dl      Urobilinogen, UA 0 2 E U /dl      Bilirubin, UA Negative     Blood, UA Small    Comprehensive metabolic panel [[id number]]  (Abnormal) Collected: 02/15/22 0736    Lab Status: Final result Specimen: Blood from Arm, Right Updated: 02/15/22 0806     Sodium 136 mmol/L      Potassium 3 9 mmol/L      Chloride 104 mmol/L      CO2 27 mmol/L      ANION GAP 5 mmol/L      BUN 18 mg/dL      Creatinine 0 90 mg/dL      Glucose 142 mg/dL      Calcium 9 1 mg/dL      AST 20 U/L      ALT 22 U/L      Alkaline Phosphatase 69 U/L      Total Protein 7 3 g/dL      Albumin 4 0 g/dL      Total Bilirubin 0 45 mg/dL      eGFR 59 ml/min/1 73sq m     Narrative:      [place] guidelines for Chronic Kidney Disease (CKD):     Stage 1 with normal or high GFR (GFR > 90 mL/min/1 73 square meters)    Stage 2 Mild CKD (GFR = 60-89 mL/min/1 73 square meters)    Stage 3A Moderate CKD (GFR = 45-59 mL/min/1 73 square meters)    Stage 3B Moderate CKD (GFR = 30-44 mL/min/1 73 square meters)    Stage 4 Severe CKD (GFR = 15-29 mL/min/1 73 square meters)    Stage 5 End Stage CKD (GFR <15 mL/min/1 73 square meters)  Note: GFR calculation is accurate only with a steady state creatinine    HS Troponin 0hr (reflex protocol) [[id number]]  (Normal) Collected: 02/15/22 0736    Lab Status: Final result Specimen: Blood from Arm, Right Updated: 02/15/22 0805     hs TnI 0hr 4 ng/L     CBC and differential [[id number]] Collected: 02/15/22 0736    Lab Status: Final result Specimen: Blood from Arm, Right Updated: 02/15/22 0743     WBC 6 06 Thousand/uL      RBC 4 62 Million/uL      Hemoglobin 14 7 g/dL      Hematocrit 44 5 %      MCV 96 fL      MCH 31 8 pg      MCHC 33 0 g/dL      RDW 12 7 %      MPV 9 0 fL      Platelets 871 Thousands/uL      nRBC 0 /100 WBCs      Neutrophils Relative 71 %      Immat GRANS % 0 %      Lymphocytes Relative 20 %      Monocytes Relative 7 %      Eosinophils Relative 1 %      Basophils Relative 1 %      Neutrophils Absolute 4 37 Thousands/µL      Immature Grans Absolute 0 01 Thousand/uL      Lymphocytes Absolute 1 20 Thousands/µL      Monocytes Absolute 0 40 Thousand/µL      Eosinophils Absolute 0 03 Thousand/µL      Basophils Absolute 0 05 Thousands/µL                  CT head without contrast   Final Result by Karin Greenberg MD (02/15 0740)      No acute intracranial abnormality  Microangiopathic changes                    Workstation performed: [id number]                    Procedures  ECG 12 Lead Documentation Only    Date/Time: 2/15/2022 7:30 AM  Performed by: Leila Amanda PA-C  Authorized by: Leila Amanda PA-C     ECG reviewed by me, the ED Provider: yes    Patient location:  ED  Previous ECG:     Previous ECG:  Compared to current    Comparison ECG info:  7/19/21  Rate:     ECG rate:  87    ECG rate assessment: normal    Rhythm:     Rhythm: sinus rhythm    Ectopy:     Ectopy: none    QRS:     QRS axis:  Left  Conduction:     Conduction: normal    ST segments:     ST segments:  Normal  T waves:     T waves: normal               ED Course  ED Course as of 02/15/22 [address] [employer] Feb 15, 2022   0857 Patient re-evaluated  She states her symptoms have completely resolved and was able to ambulate to the bathroom unassisted  She is stable for discharge  SBIRT 22yo+      Most Recent Value   SBIRT (22 yo +)    In order to provide better care to our patients, we are screening all of our patients for alcohol and drug use  Would it be okay to ask you these screening questions? Yes Filed at: 02/15/2022 0766   Initial Alcohol Screen: US AUDIT-C     1  How often do you have a drink containing alcohol? 0 Filed at: 02/15/2022 0717   2  How many drinks containing alcohol do you have on a typical day you are drinking? 0 Filed at: 02/15/2022 0717   3a  Male UNDER 65: How often do you have five or more drinks on one occasion? 0 Filed at: 02/15/2022 0717   3b  FEMALE Any Age, or MALE 65+: How often do you have 4 or more drinks on one occassion? 0 Filed at: 02/15/2022 0717   Audit-C Score 0 Filed at: 02/15/2022 5497   TONY: How many times in the past year have you    Used an illegal drug or used a prescription medication for non-medical reasons? Never Filed at: 02/15/2022 7285                    MDM  Number of Diagnoses or Management Options  Does not feel right: new and requires workup  Fullness in head: new and requires workup  Diagnosis management comments: 78yo female presenting for a "wooshing" feeling in her head  Started overnight, gradually improving  Denies any dizziness, headache, or visual changes  She is worried about a heart attack although denies any chest pain or shortness of breath  She is hemodynamically stable  Exam is unremarkable  Initial ED plan: Check cardiac labs, EKG, UA, and CT head  IV fluid bolus  Final assessment: Labs overall unremarkable including normal blood counts, electrolytes, and renal function  EKG without ischemic changes and high sensitivity troponin is normal  UA without signs of infection  CT head is negative for acute findings  On re-evaluation, her symptoms have resolved  No indication for admission at this time  Advised close PCP follow-up  ED return precautions discussed  Patient expressed understanding and is agreeable to plan  Patient discharged in stable condition  Amount and/or Complexity of Data Reviewed  Clinical lab tests: ordered and reviewed  Tests in the radiology section of CPT®: reviewed and ordered  Tests in the medicine section of CPT®: ordered and reviewed  Review and summarize past medical records: yes  Independent visualization of images, tracings, or specimens: yes    Risk of Complications, Morbidity, and/or Mortality  Presenting problems: moderate  Diagnostic procedures: moderate  Management options: moderate    Patient Progress  Patient progress: stable      Disposition  Final diagnoses:   Does not feel right   Fullness in head     Time reflects when diagnosis was documented in both MDM as applicable and the Disposition within this note     Time User Action Codes Description Comment    2/15/2022  8:58 AM Felecia Sandhu Add [R68 89] Does not feel right     2/15/2022  8:58 AM Felecia Sandhu Add [R68 89] Fullness in head       ED Disposition     ED Disposition Condition Date/Time Comment    Discharge Stable Tue Feb 15, 2022  8:47 AM Nestora Foots discharge to home/self care              Follow-up Information     Follow up With Specialties Details Why Contact Info Additional Information    Michelle Garcia MD Internal Medicine Schedule an appointment as soon as possible for a visit   [address] [facility] Emergency Department Emergency Medicine  If symptoms worsen [address] Emergency Department, [address] [place] [address], [id number]          Discharge Medication List as of 2/15/2022  8:59 AM      CONTINUE these medications which have NOT CHANGED    Details   ALPRAZolam (XANAX) 0 5 mg tablet Take 0 5 mg by mouth 2 (two) times a day, Starting Tue 4/24/2018, Historical Med      Calcium Carbonate (CALTRATE 600) 1500 (600 Ca) MG TABS 1 tab(s), Historical Med      calcium polycarbophil (FIBERCON) 625 mg tablet Take 625 mg by mouth daily, Historical Med      estradiol (ESTRACE) 0 1 mg/g vaginal cream Insert 0 5 g into the vagina 2 (two) times a week, Starting Mon 9/17/2018, Normal      levothyroxine 88 mcg tablet TAKE 1 TABLET BY MOUTH ON EMPTY STOMACH EVERY MORNING, Historical Med      lisinopril (ZESTRIL) 2 5 mg tablet Take by mouth, Historical Med      Multiple Vitamins-Minerals (MULTIVITAMIN ADULT PO) 1 cap(s), Historical Med      ofloxacin (OCUFLOX) 0 3 % ophthalmic solution INSTILL 1 DROP INTO RIGHT EYE 4 X A DAY AS DIRECTED START 3 DAYS PRIOR TO SURGERY IN OPERATIVE EYE, Historical Med      prednisoLONE acetate (PRED FORTE) 1 % ophthalmic suspension INSTILL 1 DROP INTO RIGHT EYE FOUR TIMES A DAY AS DIRECTED USE AFTER SURGERY TO OPERATIVE EYE , Historical Med             No discharge procedures on file      PDMP Review     None          ED Provider  Electronically Signed by           Josselin Pradhan PA-C  02/15/22 1709

## 2022-03-01 ENCOUNTER — PROCEDURE VISIT (OUTPATIENT)
Dept: OBGYN CLINIC | Facility: CLINIC | Age: 82
End: 2022-03-01
Payer: MEDICARE

## 2022-03-01 VITALS
BODY MASS INDEX: 31.39 KG/M2 | HEIGHT: 62 IN | DIASTOLIC BLOOD PRESSURE: 80 MMHG | WEIGHT: 170.6 LBS | SYSTOLIC BLOOD PRESSURE: 110 MMHG

## 2022-03-01 DIAGNOSIS — L90.0 LICHEN SCLEROSUS: Primary | ICD-10-CM

## 2022-03-01 PROCEDURE — 88313 SPECIAL STAINS GROUP 2: CPT | Performed by: PATHOLOGY

## 2022-03-01 PROCEDURE — 88305 TISSUE EXAM BY PATHOLOGIST: CPT | Performed by: PATHOLOGY

## 2022-03-01 PROCEDURE — 56605 BIOPSY OF VULVA/PERINEUM: CPT | Performed by: STUDENT IN AN ORGANIZED HEALTH CARE EDUCATION/TRAINING PROGRAM

## 2022-03-01 NOTE — PROGRESS NOTES
Biopsy    Date/Time: 3/1/2022 3:52 PM  Performed by: Iona Gaytan MD  Authorized by: Iona Gaytan MD   Universal Protocol:  Consent: Verbal consent obtained  Risks and benefits: risks, benefits and alternatives were discussed  Consent given by: patient  Patient understanding: patient states understanding of the procedure being performed  Patient consent: the patient's understanding of the procedure matches consent given  Patient identity confirmed: verbally with patient      Procedure Details - Skin Biopsy:     Biopsy tissue type: mucous membrane    Biopsy method: punch biopsy      Body area: Anogenital    Anogenital location:  Vulva    Vaginal Lesion: No      Initial size (mm):  4    Final defect size (mm):  4    Malignancy: benign lesion       Right labia majora area of white parchment change  Right perineal fold with skin separation and white thinning    Consistent with LS   Return in 4-6 wks for bx check and initiation of clobetasol

## 2022-03-03 ENCOUNTER — TELEPHONE (OUTPATIENT)
Dept: LABOR AND DELIVERY | Facility: HOSPITAL | Age: 82
End: 2022-03-03

## 2022-04-05 ENCOUNTER — OFFICE VISIT (OUTPATIENT)
Dept: OBGYN CLINIC | Facility: CLINIC | Age: 82
End: 2022-04-05
Payer: MEDICARE

## 2022-04-05 VITALS
BODY MASS INDEX: 31.28 KG/M2 | HEIGHT: 62 IN | DIASTOLIC BLOOD PRESSURE: 86 MMHG | SYSTOLIC BLOOD PRESSURE: 130 MMHG | WEIGHT: 170 LBS

## 2022-04-05 DIAGNOSIS — L90.0 LICHEN SCLEROSUS: Primary | ICD-10-CM

## 2022-04-05 DIAGNOSIS — N95.2 ATROPHIC VAGINITIS: ICD-10-CM

## 2022-04-05 PROCEDURE — 99213 OFFICE O/P EST LOW 20 MIN: CPT | Performed by: STUDENT IN AN ORGANIZED HEALTH CARE EDUCATION/TRAINING PROGRAM

## 2022-04-05 RX ORDER — CLOBETASOL PROPIONATE 0.5 MG/G
OINTMENT TOPICAL 2 TIMES DAILY
Qty: 30 G | Refills: 0 | Status: SHIPPED | OUTPATIENT
Start: 2022-04-05

## 2022-04-05 RX ORDER — ESTRADIOL 0.1 MG/G
0.5 CREAM VAGINAL 2 TIMES WEEKLY
Qty: 42.5 G | Refills: 1 | Status: SHIPPED | OUTPATIENT
Start: 2022-04-07

## 2022-04-05 NOTE — PROGRESS NOTES
Assessment/Plan:   Atrophic vulvovaginitis  Continue estrace twice weekly    Lichen sclerosus  Biopsy sites healing well  Reviewed clobetasol use  Discussed chronic nature and monitoring needed given association with TAWNYA and vulvar cancers  Return in 8-12 wks for recheck       Diagnoses and all orders for this visit:    Lichen sclerosus  -     clobetasol (TEMOVATE) 0 05 % ointment; Apply topically 2 (two) times a day    Atrophic vaginitis  -     estradiol (ESTRACE) 0 1 mg/g vaginal cream; Insert 0 5 g into the vagina 2 (two) times a week          Subjective:     Patient ID: Jacqlyn Tim is a 79 y o  female  80 yo here for post biopsy check  Reports areas are feeling much better with estrace alone, well healed, no pain  No bleeding  Still has prolapse symptoms but not desiring surgery and bad experience with pessary- will consider referral after her busy season (summer)  She is celebrating her sons 61th birthday today! Review of Systems   Constitutional: Negative for chills and fever  HENT: Negative for ear pain and sore throat  Eyes: Negative for pain and visual disturbance  Respiratory: Negative for cough and shortness of breath  Cardiovascular: Negative for chest pain and palpitations  Gastrointestinal: Negative for abdominal pain, constipation, diarrhea, nausea and vomiting  Genitourinary: Negative for dyspareunia, dysuria, frequency, hematuria, pelvic pain, urgency, vaginal bleeding, vaginal discharge and vaginal pain (improved!)  Musculoskeletal: Negative for arthralgias and back pain  Skin: Negative for color change and rash  Neurological: Negative for seizures and syncope  All other systems reviewed and are negative  Objective:     Physical Exam  Vitals reviewed  Constitutional:       General: She is not in acute distress  Appearance: She is well-developed  She is not diaphoretic  HENT:      Head: Normocephalic and atraumatic     Pulmonary:      Effort: Pulmonary effort is normal  No respiratory distress  Genitourinary:      Musculoskeletal:      Cervical back: Normal range of motion  Neurological:      Mental Status: She is alert and oriented to person, place, and time  Psychiatric:         Behavior: Behavior normal          Thought Content:  Thought content normal          Judgment: Judgment normal

## 2022-04-05 NOTE — ASSESSMENT & PLAN NOTE
Biopsy sites healing well  Reviewed clobetasol use  Discussed chronic nature and monitoring needed given association with TAWNYA and vulvar cancers  Return in 8-12 wks for recheck

## 2022-10-05 RX ORDER — POLYETHYLENE GLYCOL 3350 17 G/17G
17 POWDER, FOR SOLUTION ORAL DAILY
COMMUNITY

## 2022-10-05 RX ORDER — CONJUGATED ESTROGENS 0.62 MG/G
CREAM VAGINAL 3 TIMES WEEKLY
COMMUNITY

## 2022-10-05 RX ORDER — IBUPROFEN 200 MG
200-800 TABLET ORAL EVERY 6 HOURS PRN
COMMUNITY

## 2022-10-05 NOTE — PRE-PROCEDURE INSTRUCTIONS
Pre-Surgery Instructions:   Medication Instructions   • ALPRAZolam (XANAX) 0 5 mg tablet Take day of surgery  • estrogens, conjugated (Premarin) vaginal cream Hold day of surgery  • ibuprofen (MOTRIN) 200 mg tablet Stop taking 3 days prior to surgery  • levothyroxine 88 mcg tablet Take day of surgery  • lisinopril (ZESTRIL) 2 5 mg tablet Take night before surgery   • Multiple Vitamins-Minerals (MULTIVITAMIN ADULT PO) Stop taking 7 days prior to surgery  • polyethylene glycol (MIRALAX) 17 g packet Hold day of surgery  • Polyvinyl Alcohol-Povidone (REFRESH OP) Uses PRN- OK to take day of surgery   Verbal pre-op instructions given to pt  via phone  Pt verbalizes understanding

## 2022-10-06 ENCOUNTER — ANESTHESIA EVENT (OUTPATIENT)
Dept: PERIOP | Facility: HOSPITAL | Age: 82
End: 2022-10-06
Payer: MEDICARE

## 2022-10-10 ENCOUNTER — HOSPITAL ENCOUNTER (OUTPATIENT)
Facility: HOSPITAL | Age: 82
Setting detail: OUTPATIENT SURGERY
Discharge: HOME | End: 2022-10-10
Attending: OBSTETRICS & GYNECOLOGY | Admitting: OBSTETRICS & GYNECOLOGY
Payer: MEDICARE

## 2022-10-10 ENCOUNTER — ANESTHESIA (OUTPATIENT)
Dept: PERIOP | Facility: HOSPITAL | Age: 82
End: 2022-10-10
Payer: MEDICARE

## 2022-10-10 VITALS
RESPIRATION RATE: 18 BRPM | OXYGEN SATURATION: 96 % | HEART RATE: 77 BPM | HEIGHT: 62 IN | TEMPERATURE: 98 F | DIASTOLIC BLOOD PRESSURE: 64 MMHG | SYSTOLIC BLOOD PRESSURE: 126 MMHG | WEIGHT: 167.99 LBS | BODY MASS INDEX: 30.91 KG/M2

## 2022-10-10 DIAGNOSIS — N32.81 OVERACTIVE BLADDER: ICD-10-CM

## 2022-10-10 DIAGNOSIS — N81.2 INCOMPLETE UTEROVAGINAL PROLAPSE: Primary | ICD-10-CM

## 2022-10-10 PROBLEM — N36.41 HYPERMOBILITY OF URETHRA: Status: ACTIVE | Noted: 2022-10-10

## 2022-10-10 PROBLEM — N39.3 STRESS INCONTINENCE (FEMALE) (MALE): Status: ACTIVE | Noted: 2022-10-10

## 2022-10-10 PROBLEM — N81.84 PELVIC MUSCLE WASTING: Status: ACTIVE | Noted: 2022-10-10

## 2022-10-10 PROBLEM — N81.89 OTHER FEMALE GENITAL PROLAPSE: Status: ACTIVE | Noted: 2022-10-10

## 2022-10-10 PROBLEM — N81.6 RECTOCELE: Status: ACTIVE | Noted: 2022-10-10

## 2022-10-10 PROCEDURE — 51798 US URINE CAPACITY MEASURE: CPT | Performed by: OBSTETRICS & GYNECOLOGY

## 2022-10-10 PROCEDURE — NC001 PR NO CHARGE: Performed by: OBSTETRICS & GYNECOLOGY

## 2022-10-10 PROCEDURE — C1771 REP DEV, URINARY, W/SLING: HCPCS | Performed by: OBSTETRICS & GYNECOLOGY

## 2022-10-10 DEVICE — SINGLE INCISION SLING SYSTEM
Type: IMPLANTABLE DEVICE | Site: VAGINA | Status: FUNCTIONAL
Brand: ALTIS

## 2022-10-10 DEVICE — THE NEUGUIDE™ IS A SINGLE USE TRANS-VAGINAL DEVICE FOR THE REPAIR OF PELVIC ORGAN PROLAPSE (POP) BY ANCHORING SUTURES TO LIGAMENTS OF THE PELVIC FLOOR.
Type: IMPLANTABLE DEVICE | Site: VAGINA | Status: FUNCTIONAL
Brand: NEUGUIDE™

## 2022-10-10 RX ORDER — GABAPENTIN 400 MG/1
400 CAPSULE ORAL ONCE
Status: COMPLETED | OUTPATIENT
Start: 2022-10-10 | End: 2022-10-10

## 2022-10-10 RX ORDER — ACETAMINOPHEN 325 MG/1
975 TABLET ORAL EVERY 6 HOURS PRN
Status: DISCONTINUED | OUTPATIENT
Start: 2022-10-10 | End: 2022-10-10 | Stop reason: HOSPADM

## 2022-10-10 RX ORDER — DEXAMETHASONE SODIUM PHOSPHATE 10 MG/ML
INJECTION, SOLUTION INTRAMUSCULAR; INTRAVENOUS AS NEEDED
Status: DISCONTINUED | OUTPATIENT
Start: 2022-10-10 | End: 2022-10-10

## 2022-10-10 RX ORDER — ACETAMINOPHEN 325 MG/1
975 TABLET ORAL EVERY 6 HOURS
Refills: 0
Start: 2022-10-10

## 2022-10-10 RX ORDER — ACETAMINOPHEN 325 MG/1
975 TABLET ORAL ONCE
Status: COMPLETED | OUTPATIENT
Start: 2022-10-10 | End: 2022-10-10

## 2022-10-10 RX ORDER — HYDROMORPHONE HCL/PF 1 MG/ML
0.5 SYRINGE (ML) INJECTION
Status: DISCONTINUED | OUTPATIENT
Start: 2022-10-10 | End: 2022-10-10 | Stop reason: HOSPADM

## 2022-10-10 RX ORDER — CEFAZOLIN SODIUM 2 G/50ML
2000 SOLUTION INTRAVENOUS ONCE
Status: DISCONTINUED | OUTPATIENT
Start: 2022-10-10 | End: 2022-10-10 | Stop reason: HOSPADM

## 2022-10-10 RX ORDER — CEFAZOLIN SODIUM 2 G/50ML
SOLUTION INTRAVENOUS AS NEEDED
Status: DISCONTINUED | OUTPATIENT
Start: 2022-10-10 | End: 2022-10-10

## 2022-10-10 RX ORDER — MIRABEGRON 50 MG/1
50 TABLET, FILM COATED, EXTENDED RELEASE ORAL DAILY
Qty: 30 TABLET | Refills: 0 | Status: SHIPPED | OUTPATIENT
Start: 2022-10-10

## 2022-10-10 RX ORDER — LIDOCAINE HYDROCHLORIDE 20 MG/ML
INJECTION, SOLUTION EPIDURAL; INFILTRATION; INTRACAUDAL; PERINEURAL AS NEEDED
Status: DISCONTINUED | OUTPATIENT
Start: 2022-10-10 | End: 2022-10-10

## 2022-10-10 RX ORDER — SODIUM CHLORIDE, SODIUM LACTATE, POTASSIUM CHLORIDE, CALCIUM CHLORIDE 600; 310; 30; 20 MG/100ML; MG/100ML; MG/100ML; MG/100ML
125 INJECTION, SOLUTION INTRAVENOUS CONTINUOUS
Status: DISCONTINUED | OUTPATIENT
Start: 2022-10-10 | End: 2022-10-10 | Stop reason: HOSPADM

## 2022-10-10 RX ORDER — PROPOFOL 10 MG/ML
INJECTION, EMULSION INTRAVENOUS AS NEEDED
Status: DISCONTINUED | OUTPATIENT
Start: 2022-10-10 | End: 2022-10-10

## 2022-10-10 RX ORDER — FENTANYL CITRATE 50 UG/ML
INJECTION, SOLUTION INTRAMUSCULAR; INTRAVENOUS AS NEEDED
Status: DISCONTINUED | OUTPATIENT
Start: 2022-10-10 | End: 2022-10-10

## 2022-10-10 RX ORDER — DOCUSATE SODIUM 100 MG/1
100 CAPSULE, LIQUID FILLED ORAL 2 TIMES DAILY
Refills: 0
Start: 2022-10-10

## 2022-10-10 RX ORDER — ONDANSETRON 2 MG/ML
4 INJECTION INTRAMUSCULAR; INTRAVENOUS EVERY 6 HOURS PRN
Status: DISCONTINUED | OUTPATIENT
Start: 2022-10-10 | End: 2022-10-10 | Stop reason: HOSPADM

## 2022-10-10 RX ORDER — FENTANYL CITRATE/PF 50 MCG/ML
25 SYRINGE (ML) INJECTION
Status: DISCONTINUED | OUTPATIENT
Start: 2022-10-10 | End: 2022-10-10 | Stop reason: HOSPADM

## 2022-10-10 RX ORDER — ONDANSETRON 2 MG/ML
INJECTION INTRAMUSCULAR; INTRAVENOUS AS NEEDED
Status: DISCONTINUED | OUTPATIENT
Start: 2022-10-10 | End: 2022-10-10

## 2022-10-10 RX ORDER — FUROSEMIDE 10 MG/ML
INJECTION INTRAMUSCULAR; INTRAVENOUS AS NEEDED
Status: DISCONTINUED | OUTPATIENT
Start: 2022-10-10 | End: 2022-10-10

## 2022-10-10 RX ORDER — PROPOFOL 10 MG/ML
INJECTION, EMULSION INTRAVENOUS CONTINUOUS PRN
Status: DISCONTINUED | OUTPATIENT
Start: 2022-10-10 | End: 2022-10-10

## 2022-10-10 RX ORDER — ONDANSETRON 2 MG/ML
4 INJECTION INTRAMUSCULAR; INTRAVENOUS ONCE AS NEEDED
Status: DISCONTINUED | OUTPATIENT
Start: 2022-10-10 | End: 2022-10-10 | Stop reason: HOSPADM

## 2022-10-10 RX ADMIN — FENTANYL CITRATE 25 MCG: 50 INJECTION INTRAMUSCULAR; INTRAVENOUS at 11:56

## 2022-10-10 RX ADMIN — ACETAMINOPHEN 975 MG: 325 TABLET ORAL at 09:02

## 2022-10-10 RX ADMIN — DEXAMETHASONE SODIUM PHOSPHATE 5 MG: 10 INJECTION, SOLUTION INTRAMUSCULAR; INTRAVENOUS at 10:46

## 2022-10-10 RX ADMIN — PROPOFOL 40 MG: 10 INJECTION, EMULSION INTRAVENOUS at 10:42

## 2022-10-10 RX ADMIN — GABAPENTIN 400 MG: 400 CAPSULE ORAL at 09:02

## 2022-10-10 RX ADMIN — CEFAZOLIN SODIUM 2000 MG: 2 SOLUTION INTRAVENOUS at 10:33

## 2022-10-10 RX ADMIN — SODIUM CHLORIDE, SODIUM LACTATE, POTASSIUM CHLORIDE, AND CALCIUM CHLORIDE: .6; .31; .03; .02 INJECTION, SOLUTION INTRAVENOUS at 12:18

## 2022-10-10 RX ADMIN — FUROSEMIDE 10 MG: 10 INJECTION, SOLUTION INTRAVENOUS at 12:01

## 2022-10-10 RX ADMIN — PROPOFOL 100 MCG/KG/MIN: 10 INJECTION, EMULSION INTRAVENOUS at 10:42

## 2022-10-10 RX ADMIN — FENTANYL CITRATE 25 MCG: 50 INJECTION INTRAMUSCULAR; INTRAVENOUS at 10:42

## 2022-10-10 RX ADMIN — ONDANSETRON 4 MG: 2 INJECTION INTRAMUSCULAR; INTRAVENOUS at 12:04

## 2022-10-10 RX ADMIN — FENTANYL CITRATE 25 MCG: 50 INJECTION INTRAMUSCULAR; INTRAVENOUS at 11:17

## 2022-10-10 RX ADMIN — LIDOCAINE HYDROCHLORIDE 80 MG: 20 INJECTION, SOLUTION EPIDURAL; INFILTRATION; INTRACAUDAL; PERINEURAL at 10:42

## 2022-10-10 RX ADMIN — ACETAMINOPHEN 975 MG: 325 TABLET ORAL at 14:40

## 2022-10-10 RX ADMIN — FENTANYL CITRATE 25 MCG: 50 INJECTION INTRAMUSCULAR; INTRAVENOUS at 11:01

## 2022-10-10 RX ADMIN — SODIUM CHLORIDE, SODIUM LACTATE, POTASSIUM CHLORIDE, AND CALCIUM CHLORIDE 125 ML/HR: .6; .31; .03; .02 INJECTION, SOLUTION INTRAVENOUS at 09:05

## 2022-10-10 NOTE — OP NOTE
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]  Other female genital prolapse [N81 89]  Hypermobility of urethra [N36 41]  Stress incontinence (female) (male) [N39 3]    Post-Op Diagnosis Codes:     * Incomplete uterovaginal prolapse [N81 2]     * Cystocele, midline [N81 11]     * Rectocele [N81 6]     * 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]     * Other female genital prolapse [N81 89]     * Hypermobility of urethra [N36 41]     * Stress incontinence (female) (male) [N39 3]    Procedure(s) (LRB):  COLPOSUSPENSION VAGINAL EXTRAPERITONEAL(ENPLACE) (N/A)  A&P COLPORRHAPHY (N/A)  PB SLING(ALTIS) (N/A)  CYSTO (N/A)    Estimated Blood Loss: Minimal    Anesthesia Type:   IV Sedation with Anesthesia    Operative Indications:  Incomplete uterovaginal prolapse [N81 2]  Cystocele, midline [N81 11]  Rectocele [N81 6]  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]  Other female genital prolapse [N81 89]  Hypermobility of urethra [N36 41]  Stress incontinence (female) (male) [N39 3]    Operative Findings:  - Incomplete uterovaginal prolapse  - Grade 2 Cystocele  - Grade 1 Rectocele  - Cystoscopy: efflux noted from bilateral ureteral orifices  No mesh, suture material, or injury noted to bladder lumen  Moderate trabeculations noted on cysto  Complications:   None    Procedure and Technique:  Appropriate preoperative antibiotics chosen per ACOG guidelines were given  Bilateral SCDs were placed in the lower extremities for DVT prevention prior to the institution of anesthesia  The patient was identified in the holding area by the operating room staff and attending physician   She was taken to the operating room where anesthesia was instituted without complications  She was placed in the dorsal lithotomy position with the legs in [address] [place] [address] with care taken to avoid excessive flexion or extension of her lower extremities  The patient was prepped and draped in the usual sterile fashion  A John catheter was inserted  A finger was placed in the lateral vagina, with careful palpation of the ischial spines and sacrospinous ligaments bilaterally  The right finger sleeve was then applied to the surgeon's index finger  These landmarks were then again palpated with the finger sleeve in place  Location along the sacrospinous ligament approximately in the middle 1/3 of the ligament as well as the lower 1/3 of the ligament was carefully palpated, and the trocar device loaded with the trocar device loaded with a 7 inch 22G spinal needle to inject the trocar site initially with Marcaine/Lidocaine/Epinephrine local totaling 1 5 cc injection  The trocar with 0 Prolene suture at this location while the surgeons finger was firmly pressed against the ligament, approximately 2 cm medial to the ischial spine using the sacrum as a guide  Once the suture was anchored in place, which was confirmed by tenting of the ligament with gentle tugging, all instruments were removed from the vagina  Rectal exam confirmed proper location as well  She had the exact same procedure performed on the contralateral side  Next local was injected into the cervico-vaginal junction and a 3 cm anterior vaginal epithelial incision was made along the anterior cervico-vaginal junction, until the cervical stroma was encountered  Next, one strand of the Prolene suture was passed backwards using a large rojas needle through its entering point in the vaginal wall and medially through the cervical stromal tissue  The second strand on the same side was passed caudally and proximal to the cervical os using the same technique   The exact same procedure was performed on the contralateral side  Attention was turned to the anterior wall where a persistent cystocele was noted  Two Allis clamps were applied horizontally along the vaginal incision to grasp the dependent portion of the cystocele for traction  The epithelium was further  from the vaginal muscularis sharply with tenotomy and metzenbaum scissors and bluntly with peanut sponges and a raytec  Excess vaginal mucosal skin was trimmed  The vaginal wall was then plicated in a vertical imbricating fashion using 2-0 PDS  We then began closure of the anterior vaginal epithelial incision with a 2-0 Vicryl sutures in a running locked stitch from lateral to medial with two separate sutures from each vaginal apex  The incision was intentionally left open after the first few throws from each side  The EnPlace prolene sutures were then tied down separately, one at a time until proper apical support was obtained and then they were tied together pushing the prolapse up to the anatomic position of the vaginal apex  The 2-0 PDS stitches were then tied down  We then finished closure of the anterior vaginal epithelial incision with the two 2-0 Vicryl sutures in a running locked stitch from lateral to medial  The incision was inspected and noted to be hemostatic  We turned our attention for performance of the single incision sling  At this time, the mid urethral zone was identified in reference to the John catheter and urethral meatus and local anesthetic solution was injected into the anterior vaginal wall at the mid urethral level for hydrodissection and vasoconstriction  Next, local was injected at the midline and to the left and right of midline directing the infiltration laterally towards the cephalad aspect of the inferior pubic ramus bilaterally   Care was taken to ensure that the sulcus was flattened and free of infiltration to minimize chance for buttonholing or tapping too close to the anterolateral sulcus  After completion of hydrodissection, 2 Allis clamps were used to grasp the anterior vaginal wall for traction  A 1 5 cm incision was made to the midline  Two Allis clamps were then placed on each cut edge of the incision for stabilization  Tenotomy scissors were used to create a small vaginal tunnel with sharp and blunt dissection above the anterior vaginal wall directed laterally towards the cephalad aspect of the inferior pubic ramus bilaterally  Dissection was carried out to the edge of the bone itself but no dissection into the obturator internus muscle  Once the dissection was complete, the sling was placed  The Altis system was used  An index finger was placed in the vagina for guidance  The Altis trocar was placed into the pre-dissected tract  The handle was held in horizontal slight upward canting to avoid buttonholing of the sulcus  Cephalad drift was used to allow passage around the inferior pubic ramus  A thumb was placed on the heel of the introducer to allow a push/pivot maneuver to place a fixed anchor into the obturator internus muscle membrane complex on the patient's left side  Proper handle deviation confirmed proper anchor placement, as well as a gentle tugging on the sling  Once the introducer was removed, it also confirmed proper anchor placement  The exact same sequence of steps was repeated on the patient's right side with adjustable anchor  Once it was complete, the introducer was removed and gentle tugging again confirmed proper anchor placement  The John catheter was then used to drain the bladder and then it was removed and a diagnostic cystoscopy was performed by instilling 300 mL of fluid into the bladder  Both ureters were effluxing normally and there were no lacerations or injury in the lower urinary tract  We used Crede maneuver to elicit loss of fluid from the urethral meatus and there was loss of fluid noted   The tensioning suture was used to adjust the tape until there was minimal to no leakage with Crede  After appropriate tensioning, the tensioning suture was cut and the vaginal incision was closed with 2-0 Vicryl suture in a running locked stitch  At this time, we placed the John back in the bladder  Attention was then turned to the posterior compartment where 2 Allis clamps were placed in the posterior fourchette over the mucocutaneous border to reduce the markedly relaxed vaginal outlet  Dilute Marcaine solution was injected into the perineum  A erika-shaped incision was made extending from the vaginal mucosa onto the perineum  The overlying skin was removed en bloc  We then began closure of the posterior colporrhaphy with a 2-0 Vicryl suture in a running locked stitch  We reapproximated the perineal body with a 0-Vicryl interrupted sutures x 2  We closed the remainder of the colporrhaphy to the level of the hymen  We closed the perineal skin with 2-0 Vicryl in a subcuticular fashion  No bladder, ureteral, viscus, or solid organ injury were noted at the end of the procedure  Irrigation was performed  We completed the procedure  Vaginal packing was placed in the vagina  There were no complications  The sponge, needle and instrument count were correct x2  The patient tolerated the procedure well and went to the recovery room in stable condition and she is stable at the moment of this dictation  Dr Kelechi Mendez was present for the entire procedure      Patient Disposition:  PACU         SIGNATURE: Katy Vogel MD  DATE: October 10, 2022  TIME: 12:45 PM

## 2022-10-10 NOTE — ANESTHESIA PREPROCEDURE EVALUATION
Procedure:  COLPOSUSPENSION VAGINAL EXTRAPERITONEAL(ENPLACE) (N/A Vagina )  A&P COLPORRHAPHY (N/A Perineum)  PB SLING(ALTIS) (N/A Vagina )  CYSTO (N/A Bladder)    Relevant Problems   CARDIO   (+) Essential hypertension   (+) Hyperlipidemia      ENDO   (+) Hypothyroidism      MUSCULOSKELETAL   (+) Osteoarthritis of knee   (+) Pelvic muscle wasting      NEURO/PSYCH   (+) Depression   (+) Generalized anxiety disorder        Physical Exam    Airway    Mallampati score: II  TM Distance: >3 FB  Neck ROM: full     Dental   No notable dental hx     Cardiovascular  Rhythm: regular, Rate: normal, Cardiovascular exam normal    Pulmonary  Pulmonary exam normal Breath sounds clear to auscultation,     Other Findings        Anesthesia Plan  ASA Score- 2     Anesthesia Type-         Additional Monitors:   Airway Plan:     Comment: GA prn  Plan Factors-    Chart reviewed  EKG reviewed  Existing labs reviewed  Patient summary reviewed  Patient is not a current smoker  Patient not instructed to abstain from smoking on day of procedure  Patient did not smoke on day of surgery  There is medical exclusion for perioperative obstructive sleep apnea risk education  Induction- intravenous  Postoperative Plan-     Informed Consent- Anesthetic plan and risks discussed with patient

## 2022-10-10 NOTE — DISCHARGE INSTRUCTIONS
Apical Vaginal Repair (EnPlace System)  WHAT YOU NEED TO KNOW:   An apical vaginal repair is a procedure to lift the cervix, vagina  and surrounding structures to the normal anatomical position  This can help prevent you from having a bulge sensation in the vagina  The procedure is also called an EnPlace procedure  You have Vaginal Packing in Place that will stay in place until Friday, Oct 14  DISCHARGE INSTRUCTIONS:   Medicines:   Pain medicine: You may need medicine to take away or decrease pain  Learn how to take your medicine  Ask what medicine and how much you should take  Be sure you know how, when, and how often to take it  Do not wait until the pain is severe before you take your medicine  Tell caregivers if your pain does not decrease  Pain medicine can make you dizzy or sleepy  Prevent falls by calling someone when you get out of bed or if you need help  Antibiotics: This medicine is given to fight or prevent an infection caused by bacteria  Always take your antibiotics exactly as ordered by your healthcare provider  Do not stop taking your medicine unless directed by your healthcare provider  Never save antibiotics or take leftover antibiotics that were given to you for another illness  Take your medicine as directed  Contact your healthcare provider if you think your medicine is not helping or if you have side effects  Tell him or her if you are allergic to any medicine  Keep a list of the medicines, vitamins, and herbs you take  Include the amounts, and when and why you take them  Bring the list or the pill bottles to follow-up visits  Carry your medicine list with you in case of an emergency  Follow up with your healthcare provider as directed:  Write down your questions so you remember to ask them during your visits  Self-care:   Sex:  Do not have sex until your healthcare provider says it is okay  Kegel exercises:   To do kegel exercises, squeeze your pelvic floor muscles for 5 to 10 seconds, then release  Regular kegel exercises will help your pelvic floor muscles become stronger  This will help prevent you from leaking urine  Ask your healthcare provider when to start these exercises and how often to do them  Sanitary pad:  Change your sanitary pad regularly  Keep track of how often you change the pad  John catheter:  Keep the bag below your waist  This will help prevent infection and other problems caused by urine flowing back into your bladder  Do not pull on the catheter because this can cause pain and bleeding, and the catheter could come out  Keep the catheter tubing free of kinks so your urine will flow into the bag  Your healthcare provider will remove the catheter as soon as possible, to help prevent infection  Wound care:  When you are allowed to bathe or shower, carefully wash your vaginal area with soap and water  Do not put pressure on your abdomen: This will help prevent damage to your surgery area  Do not strain, lift heavy objects, or stand for a very long time  Do not perform strenuous exercises, such as running and weight lifting  Activity:  You may need to start walking within a few days after your procedure  Ask your healthcare provider when to start and how long you should walk  Ask about any other exercises that may be right for you  Support socks: You may need to wear support socks  These are tight socks that help increase the circulation in your legs until you are more active  This helps prevent blood clots  Contact your healthcare provider if:   You soak a sanitary pad with blood every hour for 4 hours  You have vaginal pain that does not go away even after you take pain medicine  You have pus or a foul-smelling discharge from your genital area  You see blood in your urine  You have pain during sex  You have a fever, chills, a cough, or feel weak and achy  You have nausea and vomiting      You have questions or concerns about your condition or care  Seek care immediately or call 911 if: You feel something is bulging out into your vagina or rectum and not going back in  You cannot urinate  Your arm or leg feels warm, tender, and painful  It may look swollen and red  You suddenly feel lightheaded and short of breath  You have chest pain  You may have more pain when you take a deep breath or cough  You may cough up blood  © 2017 [facility] Information is for End User's use only and may not be sold, redistributed or otherwise used for commercial purposes  All illustrations and images included in CareNotes® are the copyrighted property of [employer]  or [employer]  The above information is an  only  It is not intended as medical advice for individual conditions or treatments  Talk to your doctor, nurse or pharmacist before following any medical regimen to see if it is safe and effective for you  Urethral Sling Procedure   WHAT YOU NEED TO KNOW:   A bladder sling procedure is surgery to treat urinary incontinence in women  The sling acts as a hammock to keep your urethra in place and hold it closed when your bladder is full  You may have vaginal bleeding or discharge for up to a week after your surgery  Use sanitary pads  Do not use tampons  You may have some pelvic discomfort or trouble urinating  DISCHARGE INSTRUCTIONS:   Call 911 for any of the following: You have sudden trouble breathing  Seek care immediately if:   Your bleeding gets worse  You have yellow or foul smelling discharge from your vagina  You cannot urinate, or you are urinating less than what is normal for you  You feel confused  Contact your healthcare provider if:   You have a fever  You do not feel like you are able to empty your bladder completely when you urinate  You feel the need to urinate very suddenly  You have burning or stinging when you urinate      You have blood in your urine  Your skin is itchy, swollen, or you have a rash  You have questions or concerns about your condition or care  Medicines:   Prescription pain medicine  may be given  Ask your how to take this medicine safely  Take your medicine as directed  Contact your healthcare provider if you think your medicine is not helping or if you have side effects  Tell him or her if you are allergic to any medicine  Keep a list of the medicines, vitamins, and herbs you take  Include the amounts, and when and why you take them  Bring the list or the pill bottles to follow-up visits  Carry your medicine list with you in case of an emergency  Self-catheterization:  You may need to put a catheter into your bladder after you urinate to empty any remaining urine  A catheter is a small rubber tube used to drain urine  Healthcare providers will teach you how to put the catheter in safely  This may be needed until you are completely emptying your bladder  John catheter: You may have a John catheter for a short period of time  The John is a tube put into your bladder to drain urine into a bag  Keep the bag below your waist  This will prevent urine from flowing back into your bladder and causing an infection or other problems  Also, keep the tube free of kinks so the urine will drain properly  Do not pull on the catheter  This can cause pain and bleeding, and may cause the catheter to come out  Activity:  Do not lift heavy objects for 6 weeks after your procedure  Do not have intercourse for 4 to 6 weeks  Do not use a tampon for 4 weeks  Ask your healthcare provider when you can return to work or your usual activities  Do pelvic muscle exercises: These are also called Kegel exercises  These exercises help strengthen your pelvic muscles and help prevent urine leakage  Tighten the muscles of your pelvis and hold them tight for 5 seconds, then relax for 5 seconds   Gradually work up to tightening them for 10 seconds and relaxing for 10 seconds  Do this 3 times each day  Keep a record:  Keep a record of when you urinate and if you leak any urine  Write down what you were doing when you leaked urine, such as coughing or sneezing  Bring the log to your follow-up visits  Prevent constipation:  Drink liquids as directed  You may need to drink more water than usual to soften your bowel movements  Eat a variety of healthy foods, especially fruit and foods high in fiber  You may need to use an over-the-counter bowel movement softener  Follow up with your healthcare provider as directed: You may need a test to check how much urine remains in your bladder after you urinate  This will help show how the sling is working  Write down your questions so you remember to ask them during your visits  © 2017 [employer] [employer] Information is for End User's use only and may not be sold, redistributed or otherwise used for commercial purposes  All illustrations and images included in CareNotes® are the copyrighted property of [employer]  or [employer]  The above information is an  only  It is not intended as medical advice for individual conditions or treatments  Talk to your doctor, nurse or pharmacist before following any medical regimen to see if it is safe and effective for you

## 2025-02-13 ENCOUNTER — HOSPITAL ENCOUNTER (EMERGENCY)
Facility: HOSPITAL | Age: 85
Discharge: HOME | End: 2025-02-13
Attending: EMERGENCY MEDICINE
Payer: MEDICARE

## 2025-02-13 ENCOUNTER — APPOINTMENT (EMERGENCY)
Dept: RADIOLOGY | Facility: HOSPITAL | Age: 85
End: 2025-02-13
Payer: MEDICARE

## 2025-02-13 VITALS
TEMPERATURE: 98.6 F | SYSTOLIC BLOOD PRESSURE: 156 MMHG | HEART RATE: 101 BPM | OXYGEN SATURATION: 94 % | DIASTOLIC BLOOD PRESSURE: 77 MMHG | RESPIRATION RATE: 20 BRPM

## 2025-02-13 DIAGNOSIS — M25.572 LEFT ANKLE PAIN: ICD-10-CM

## 2025-02-13 DIAGNOSIS — M79.672 LEFT FOOT PAIN: Primary | ICD-10-CM

## 2025-02-13 PROCEDURE — 73610 X-RAY EXAM OF ANKLE: CPT

## 2025-02-13 PROCEDURE — 73630 X-RAY EXAM OF FOOT: CPT

## 2025-02-13 PROCEDURE — 99284 EMERGENCY DEPT VISIT MOD MDM: CPT | Performed by: EMERGENCY MEDICINE

## 2025-02-13 PROCEDURE — 99283 EMERGENCY DEPT VISIT LOW MDM: CPT

## 2025-02-13 RX ORDER — CEPHALEXIN 500 MG/1
500 CAPSULE ORAL 4 TIMES DAILY
Qty: 28 CAPSULE | Refills: 0 | Status: SHIPPED | OUTPATIENT
Start: 2025-02-13 | End: 2025-02-20

## 2025-02-13 NOTE — ED PROVIDER NOTES
Time reflects when diagnosis was documented in both MDM as applicable and the Disposition within this note       Time User Action Codes Description Comment    2/13/2025 12:30 PM Leydi Urbina Add [M79.672] Left foot pain     2/13/2025 12:30 PM Leydi Urbina Add [M25.572] Left ankle pain           ED Disposition       ED Disposition   Discharge    Condition   Stable    Date/Time   Thu Feb 13, 2025 12:30 PM    Comment   Yumi Vanessashahana discharge to home/self care.                   Assessment & Plan       Medical Decision Making  85 y/o female with L foot/ ankle pain/ swelling- will get xrays and reassess.     Xrays neg for acute fracture- will give follow up with podiatry. Will also give script for abx to cover for potential cellulitis. Instructed to start if redness worsens in foot. Return precautions and follow up instructed.     Amount and/or Complexity of Data Reviewed  Radiology: ordered.    Risk  Prescription drug management.             Medications - No data to display    ED Risk Strat Scores                          SBIRT 22yo+      Flowsheet Row Most Recent Value   Initial Alcohol Screen: US AUDIT-C     1. How often do you have a drink containing alcohol? 0 Filed at: 02/13/2025 1245   2. How many drinks containing alcohol do you have on a typical day you are drinking?  0 Filed at: 02/13/2025 1245   3b. FEMALE Any Age, or MALE 65+: How often do you have 4 or more drinks on one occassion? 0 Filed at: 02/13/2025 1245   Audit-C Score 0 Filed at: 02/13/2025 1245   TONY: How many times in the past year have you...    Used an illegal drug or used a prescription medication for non-medical reasons? Never Filed at: 02/13/2025 1245                            History of Present Illness       Chief Complaint   Patient presents with    Foot Swelling     Pt reports left foot pain and swelling for the past 4 days. Denies injury or falls. Slight redness and swelling noted, concerned for infection.        Past Medical  History:   Diagnosis Date    Anxiety     Arthritis     Cystocele with rectocele     Depression     Disease of thyroid gland     hypothyroid    Hearing loss     Hypertension     PONV (postoperative nausea and vomiting)     Prediabetes     SCC (squamous cell carcinoma)     on back in past    SEFERINO (stress urinary incontinence, female)     Uterovaginal prolapse       Past Surgical History:   Procedure Laterality Date    CATARACT EXTRACTION Bilateral     COLON SURGERY  in 2002    colon resection    COLONOSCOPY      CO CMBND ANTERPOST COLPORRAPHY W/CYSTO N/A 10/10/2022    Procedure: A&P COLPORRHAPHY;  Surgeon: Elliot Angelo MD;  Location: [facility];  Service: UroGynecology           CO COLPOPEXY VAGINAL EXTRAPERITONEAL APPROACH N/A 10/10/2022    Procedure: COLPOSUSPENSION VAGINAL EXTRAPERITONEAL(ENPLACE);  Surgeon: Elliot Angelo MD;  Location: [facility];  Service: UroGynecology           CO CYSTOURETHROSCOPY N/A 10/10/2022    Procedure: CYSTO;  Surgeon: Elliot Angelo MD;  Location: [facility];  Service: UroGynecology           CO SLING OPERATION STRESS INCONTINENCE N/A 10/10/2022    Procedure: PB SLING(ALTIS);  Surgeon: Elliot Angelo MD;  Location: [facility];  Service: UroGynecology           TENDON MANIPULATION      maniscus repair in 2013      Family History   Problem Relation Age of Onset    Colon cancer Mother     Ovarian cancer Neg Hx     Breast cancer Neg Hx       Social History     Tobacco Use    Smoking status: Never    Smokeless tobacco: Never   Vaping Use    Vaping status: Never Used   Substance Use Topics    Alcohol use: Yes     Comment: social     Drug use: No      E-Cigarette/Vaping    E-Cigarette Use Never User       E-Cigarette/Vaping Substances      I have reviewed and agree with the history as documented.     83 y/o female presents to the ED for L foot pain x 4 days. She states that pain started at the bottom of the foot and then has since progressed to the entire foot. States that it has  been slightly red. Denies any calf pain or swelling. States that the symptoms are worse when she wakes up in the morning and then improves through the day. She has been taking meloxicam with some improvement. States that she had a prior knee replacement last year in the knee but denies any prior injury or surgery to the ankle/ foot. No fever, cough, sore throat, abd pain, n/v, d/c, or urinary symptoms. No cp or sob. No other complaints.       History provided by:  Patient      Review of Systems   Constitutional:  Negative for chills and fever.   HENT:  Negative for congestion, ear pain and sore throat.    Eyes:  Negative for pain and visual disturbance.   Respiratory:  Negative for cough, shortness of breath and wheezing.    Cardiovascular:  Negative for chest pain and leg swelling.   Gastrointestinal:  Negative for abdominal pain, diarrhea, nausea and vomiting.   Genitourinary:  Negative for dysuria, frequency, hematuria and urgency.   Musculoskeletal:  Negative for neck pain and neck stiffness.   Skin:  Negative for rash and wound.   Neurological:  Negative for weakness, numbness and headaches.   Psychiatric/Behavioral:  Negative for agitation and confusion.    All other systems reviewed and are negative.          Objective       ED Triage Vitals [02/13/25 1012]   Temperature Pulse Blood Pressure Respirations SpO2 Patient Position - Orthostatic VS   98.6 °F (37 °C) 101 156/77 20 94 % Sitting      Temp Source Heart Rate Source BP Location FiO2 (%) Pain Score    Temporal Monitor Left arm -- --      Vitals      Date and Time Temp Pulse SpO2 Resp BP Pain Score FACES Pain Rating User   02/13/25 1012 98.6 °F (37 °C) 101 94 % 20 156/77 -- -- RG            Physical Exam  Vitals and nursing note reviewed.   Constitutional:       Appearance: She is well-developed.   HENT:      Head: Normocephalic and atraumatic.   Eyes:      Pupils: Pupils are equal, round, and reactive to light.   Cardiovascular:      Rate and Rhythm:  Normal rate and regular rhythm.   Pulmonary:      Effort: Pulmonary effort is normal.      Breath sounds: Normal breath sounds.   Abdominal:      General: Bowel sounds are normal.      Palpations: Abdomen is soft.   Musculoskeletal:         General: Normal range of motion.      Cervical back: Normal range of motion and neck supple.      Comments: L foot- tenderness to the dorsum of the L foot with mild redness. NV intact distally. Normal DP pulses.    Skin:     General: Skin is warm and dry.   Neurological:      General: No focal deficit present.      Mental Status: She is alert and oriented to person, place, and time.      Comments: No focal deficits         Results Reviewed       None            XR foot 3+ views LEFT    (Results Pending)   XR ankle 3+ views LEFT    (Results Pending)       Procedures    ED Medication and Procedure Management   Prior to Admission Medications   Prescriptions Last Dose Informant Patient Reported? Taking?   ALPRAZolam (XANAX) 0.5 mg tablet  Self Yes No   Sig: Take 0.5 mg by mouth 2 (two) times a day   Calcium Carbonate 1500 (600 Ca) MG TABS  Self Yes No   Si tab(s)   Patient not taking: Reported on 10/5/2022   Mirabegron ER (Myrbetriq) 50 MG TB24   No No   Sig: Take 1 tablet (50 mg total) by mouth in the morning   Multiple Vitamins-Minerals (MULTIVITAMIN ADULT PO)  Self Yes No   Sig: as needed   Polyvinyl Alcohol-Povidone (REFRESH OP)   Yes No   Sig: Apply to eye as needed   acetaminophen (TYLENOL) 325 mg tablet   No No   Sig: Take 3 tablets (975 mg total) by mouth every 6 (six) hours   calcium polycarbophil (FIBERCON) 625 mg tablet   Yes No   Sig: Take 625 mg by mouth daily   Patient not taking: Reported on 10/5/2022   clobetasol (TEMOVATE) 0.05 % ointment   No No   Sig: Apply topically 2 (two) times a day   Patient not taking: Reported on 10/5/2022   docusate sodium (COLACE) 100 mg capsule   No No   Sig: Take 1 capsule (100 mg total) by mouth 2 (two) times a day   estradiol  (ESTRACE) 0.1 mg/g vaginal cream   No No   Sig: Insert 0.5 g into the vagina 2 (two) times a week   Patient not taking: No sig reported   estrogens, conjugated (Premarin) vaginal cream   Yes No   Sig: Insert into the vagina 3 (three) times a week   ibuprofen (MOTRIN) 200 mg tablet   Yes No   Sig: Take 200-800 mg by mouth every 6 (six) hours as needed for mild pain   levothyroxine 88 mcg tablet  Self Yes No   Sig: TAKE 1 TABLET BY MOUTH ON EMPTY STOMACH EVERY MORNING   lisinopril (ZESTRIL) 2.5 mg tablet  Self Yes No   Sig: Take 2.5 mg by mouth every evening   magnesium hydroxide (MILK OF MAGNESIA) 400 mg/5 mL oral suspension  Self Yes No   Sig: Take 5 mL by mouth daily as needed for constipation   polyethylene glycol (MIRALAX) 17 g packet   Yes No   Sig: Take 17 g by mouth daily      Facility-Administered Medications: None     Patient's Medications   Discharge Prescriptions    CEPHALEXIN (KEFLEX) 500 MG CAPSULE    Take 1 capsule (500 mg total) by mouth 4 (four) times a day for 7 days       Start Date: 2/13/2025 End Date: 2/20/2025       Order Dose: 500 mg       Quantity: 28 capsule    Refills: 0     No discharge procedures on file.  ED SEPSIS DOCUMENTATION   Time reflects when diagnosis was documented in both MDM as applicable and the Disposition within this note       Time User Action Codes Description Comment    2/13/2025 12:30 PM Leydi Urbina Add [M79.672] Left foot pain     2/13/2025 12:30 PM Leydi Urbina Add [M25.572] Left ankle pain                  Leydi Urbina DO  02/13/25 9631

## (undated) DEVICE — SMOKE EVACUATION TUBING WITH 8 IN INTEGRAL WAND AND SPONGE GUARD: Brand: BUFFALO FILTER

## (undated) DEVICE — VIAL DECANTER

## (undated) DEVICE — PREMIUM DRY TRAY LF: Brand: MEDLINE INDUSTRIES, INC.

## (undated) DEVICE — MEDI-VAC YANKAUER SUCTION HANDLE W/BULBOUS AND CONTROL VENT: Brand: CARDINAL HEALTH

## (undated) DEVICE — UNDER BUTTOCKS DRAPE W/FLUID CONTROL POUCH: Brand: CONVERTORS

## (undated) DEVICE — GLOVE PI ULTRA TOUCH SZ.8.0

## (undated) DEVICE — PACKING VAGINAL 2 IN

## (undated) DEVICE — NEEDLE SPINAL 22G X 7IN QUINCKE

## (undated) DEVICE — SCD SEQUENTIAL COMPRESSION COMFORT SLEEVE MEDIUM KNEE LENGTH: Brand: KENDALL SCD

## (undated) DEVICE — INTENDED FOR TISSUE SEPARATION, AND OTHER PROCEDURES THAT REQUIRE A SHARP SURGICAL BLADE TO PUNCTURE OR CUT.: Brand: BARD-PARKER SAFETY BLADES SIZE 11, STERILE

## (undated) DEVICE — EXIDINE 4 PCT

## (undated) DEVICE — CAUTERY TIP POLISHER: Brand: DEVON

## (undated) DEVICE — SUT VICRYL 2-0 SH 27 IN UNDYED J417H

## (undated) DEVICE — SPONGE CHERRY 1/2IN

## (undated) DEVICE — CATH FOLEY 18FR 5ML 2 WAY UNCOATED SILICONE

## (undated) DEVICE — NEEDLE HYPO 22G X 1-1/2 IN

## (undated) DEVICE — SUT PDS II 2-0 CT-2 27 IN Z333H

## (undated) DEVICE — SYRINGE 3ML LL

## (undated) DEVICE — ALLENTOWN DR  LUCENTE S LAP PK: Brand: CARDINAL HEALTH

## (undated) DEVICE — ELECTROSURGICAL DEVICE HOLSTER;FOR USE WITH MAXIMUM PEAK VOLTAGE OF 4000 V: Brand: FORCE TRIVERSE

## (undated) DEVICE — INTENT TO BE USED WITH SUTURE MATERIAL FOR TISSUE CLOSURE: Brand: RICHARD-ALLAN® NEEDLE 1/2 CIRCLE TAPER

## (undated) DEVICE — TUBING SUCTION 5MM X 12 FT

## (undated) DEVICE — IV FLUSH NSS 10ML POSIFLUSH

## (undated) DEVICE — BULB SYRINGE,IRRIGATION WITH PROTECTIVE CAP: Brand: DOVER

## (undated) DEVICE — 2000CC GUARDIAN II: Brand: GUARDIAN